# Patient Record
Sex: FEMALE | Race: WHITE | NOT HISPANIC OR LATINO | Employment: UNEMPLOYED | URBAN - METROPOLITAN AREA
[De-identification: names, ages, dates, MRNs, and addresses within clinical notes are randomized per-mention and may not be internally consistent; named-entity substitution may affect disease eponyms.]

---

## 2017-01-27 ENCOUNTER — TELEPHONE (OUTPATIENT)
Dept: CARDIOLOGY | Facility: CLINIC | Age: 82
End: 2017-01-27

## 2017-01-27 NOTE — TELEPHONE ENCOUNTER
----- Message from Claude Benitez sent at 1/27/2017 12:17 PM CST -----  Contact: Dr Dawson office-  [place]-  [phone] office closes at 3 pm  The office called asking for the status for cardiac clearance faxed on 01/25/2017 for the above listed patient. Thanks!

## 2017-02-02 ENCOUNTER — TELEPHONE (OUTPATIENT)
Dept: CARDIOLOGY | Facility: CLINIC | Age: 82
End: 2017-02-02

## 2017-02-02 NOTE — TELEPHONE ENCOUNTER
----- Message from Ana Morel sent at 2/2/2017 11:38 AM CST -----  Contact: [place] with Dr. Dawson's office   Belkys with Dr. Dawson's office called to check on a surgery clearance that was sent for the patient  Please return her call to     Thanks

## 2017-02-02 NOTE — TELEPHONE ENCOUNTER
Returned call to [place] at Dr Dawson's office. No answer, LVM. Dr Hernandez has not signed procedure clearances. Will be Monday before they will be signed at he is off until then

## 2017-02-23 ENCOUNTER — PATIENT MESSAGE (OUTPATIENT)
Dept: CARDIOLOGY | Facility: CLINIC | Age: 82
End: 2017-02-23

## 2017-04-27 RX ORDER — CLOPIDOGREL BISULFATE 75 MG/1
TABLET ORAL
Qty: 90 TABLET | Refills: 6 | Status: SHIPPED | OUTPATIENT
Start: 2017-04-27 | End: 2017-08-25 | Stop reason: SDUPTHER

## 2017-08-25 ENCOUNTER — OFFICE VISIT (OUTPATIENT)
Dept: CARDIOLOGY | Facility: CLINIC | Age: 82
End: 2017-08-25
Payer: MEDICARE

## 2017-08-25 VITALS
HEIGHT: 60 IN | BODY MASS INDEX: 18.74 KG/M2 | WEIGHT: 95.44 LBS | SYSTOLIC BLOOD PRESSURE: 148 MMHG | HEART RATE: 87 BPM | DIASTOLIC BLOOD PRESSURE: 72 MMHG

## 2017-08-25 DIAGNOSIS — I25.10 CORONARY ARTERY DISEASE INVOLVING NATIVE CORONARY ARTERY OF NATIVE HEART WITHOUT ANGINA PECTORIS: Chronic | ICD-10-CM

## 2017-08-25 DIAGNOSIS — Z95.5 S/P CORONARY ARTERY STENT PLACEMENT: Primary | ICD-10-CM

## 2017-08-25 PROCEDURE — 99999 PR PBB SHADOW E&M-EST. PATIENT-LVL III: CPT | Mod: PBBFAC,,, | Performed by: INTERNAL MEDICINE

## 2017-08-25 PROCEDURE — 1159F MED LIST DOCD IN RCRD: CPT | Mod: ,,, | Performed by: INTERNAL MEDICINE

## 2017-08-25 PROCEDURE — 1126F AMNT PAIN NOTED NONE PRSNT: CPT | Mod: ,,, | Performed by: INTERNAL MEDICINE

## 2017-08-25 PROCEDURE — 99213 OFFICE O/P EST LOW 20 MIN: CPT | Mod: PBBFAC,PO | Performed by: INTERNAL MEDICINE

## 2017-08-25 PROCEDURE — 99214 OFFICE O/P EST MOD 30 MIN: CPT | Mod: S$PBB,,, | Performed by: INTERNAL MEDICINE

## 2017-08-25 RX ORDER — CLOPIDOGREL BISULFATE 75 MG/1
75 TABLET ORAL DAILY
Qty: 90 TABLET | Refills: 6 | Status: SHIPPED | OUTPATIENT
Start: 2017-08-25 | End: 2018-09-17 | Stop reason: SDUPTHER

## 2017-08-25 RX ORDER — DILTIAZEM HYDROCHLORIDE 240 MG/1
240 CAPSULE, COATED, EXTENDED RELEASE ORAL NIGHTLY
Qty: 90 CAPSULE | Refills: 5 | Status: ON HOLD | OUTPATIENT
Start: 2017-08-25 | End: 2019-12-10 | Stop reason: HOSPADM

## 2017-08-25 RX ORDER — LISINOPRIL AND HYDROCHLOROTHIAZIDE 12.5; 2 MG/1; MG/1
1 TABLET ORAL EVERY MORNING
Qty: 90 TABLET | Refills: 4 | Status: ON HOLD | OUTPATIENT
Start: 2017-08-25 | End: 2019-04-04 | Stop reason: HOSPADM

## 2017-08-25 NOTE — PROGRESS NOTES
Subjective:    Patient ID:  Jessica Macdonald is a 89 y.o. female who presents for follow-up of Coronary Artery Disease (9 month f/u ) and Hypertension      HPI  Here for follow up of CAD-PCI (2014). Does all ADL's no angina. Still occasional weakness episodes.     Review of Systems   Constitution: Negative for malaise/fatigue.   Eyes: Negative for blurred vision.   Cardiovascular: Negative for chest pain, claudication, cyanosis, dyspnea on exertion, irregular heartbeat, leg swelling, near-syncope, orthopnea, palpitations, paroxysmal nocturnal dyspnea and syncope.   Respiratory: Negative for cough and shortness of breath.    Hematologic/Lymphatic: Does not bruise/bleed easily.   Musculoskeletal: Negative for back pain, falls, joint pain, muscle cramps, muscle weakness and myalgias.   Gastrointestinal: Negative for abdominal pain, change in bowel habit, nausea and vomiting.   Genitourinary: Negative for urgency.   Neurological: Negative for dizziness, focal weakness and light-headedness.        Objective:    Physical Exam   Constitutional: She is oriented to person, place, and time. She appears well-developed and well-nourished.   Neck: Normal range of motion. No JVD present.   Cardiovascular: Normal rate, normal heart sounds and intact distal pulses.  An irregular rhythm present.   Pulmonary/Chest: Effort normal and breath sounds normal.   Neurological: She is alert and oriented to person, place, and time.   Skin: Skin is warm and dry.   Psychiatric: She has a normal mood and affect.             ..    Chemistry        Component Value Date/Time     02/02/2017 0943    K 4.1 02/02/2017 0943     02/02/2017 0943    CO2 27 02/02/2017 0943    BUN 17 02/02/2017 0943    CREATININE 0.66 02/02/2017 0943    GLU 77 02/02/2017 0943        Component Value Date/Time    CALCIUM 9.4 02/02/2017 0943    ALKPHOS 83 02/02/2017 0943    AST 25 02/02/2017 0943    AST 35 04/11/2016 1536    ALT 23 02/02/2017 0943    BILITOT  0.5 02/02/2017 0943    ESTGFRAFRICA >60 02/02/2017 0943    EGFRNONAA >60 02/02/2017 0943            ..  Lab Results   Component Value Date    CHOL 227 (H) 10/08/2016     Lab Results   Component Value Date    HDL 64 10/08/2016     Lab Results   Component Value Date    LDLCALC 140.4 10/08/2016     Lab Results   Component Value Date    TRIG 113 10/08/2016     Lab Results   Component Value Date    CHOLHDL 28.2 10/08/2016     ..  Lab Results   Component Value Date    WBC 8.25 02/02/2017    HGB 12.7 02/02/2017    HCT 39.0 02/02/2017    MCV 94 02/02/2017     (H) 02/02/2017       Test(s) Reviewed  I have reviewed the following in detail:  [] Stress test   [] Angiography   [x] Echocardiogram   [x] Labs   [] Other:       Assessment:         ICD-10-CM ICD-9-CM   1. S/P coronary artery stent placement Z95.5 V45.82   2. Coronary artery disease involving native coronary artery of native heart without angina pectoris I25.10 414.01     Problem List Items Addressed This Visit     CAD (coronary artery disease) (Chronic)    Overview     6/2014 [facility]- LAD- 85% prox and mid, CX-NSD, RCA-65% prox         S/P coronary artery stent placement - Primary    Overview     6/2014 [facility]- veriflex 3x16 and promus 2.75x38           Other Visit Diagnoses    None.          Plan:           Return to clinic 9 months   Low level/low impact aerobic exercise.   See labs and med orders.  Check BP/HR with weak episodes.   Take CCB in pm

## 2018-04-02 RX ORDER — NITROGLYCERIN 0.4 MG/1
0.4 TABLET SUBLINGUAL EVERY 5 MIN PRN
Qty: 25 TABLET | Refills: 4 | Status: SHIPPED | OUTPATIENT
Start: 2018-04-02 | End: 2020-02-07

## 2018-04-09 ENCOUNTER — TELEPHONE (OUTPATIENT)
Dept: CARDIOLOGY | Facility: CLINIC | Age: 83
End: 2018-04-09

## 2018-04-09 NOTE — TELEPHONE ENCOUNTER
----- Message from Arcenio Weinstein sent at 4/9/2018  1:36 PM CDT -----  Contact: pt's daughter Jennifer Rodriguez is calling to speak with a nurse about pt's medications   Call Back#[phone]  Thanks

## 2018-04-09 NOTE — TELEPHONE ENCOUNTER
Spoke to patient daughter, wanted to know when pt. Is due to see Dr. Hernandez. Informed her the end of august 2018. Daughter verbalized understanding.

## 2018-07-17 ENCOUNTER — TELEPHONE (OUTPATIENT)
Dept: CARDIOLOGY | Facility: CLINIC | Age: 83
End: 2018-07-17

## 2018-07-17 NOTE — TELEPHONE ENCOUNTER
Request for Cardiac Clearance    Jessica Rocky Buitrago  is having back procedure and needs clearance.     Please advise on any medication holds. Pt. Taking Plavix    Please indicate if patient is cleared from a Cardiac standpoint.

## 2018-09-17 ENCOUNTER — OFFICE VISIT (OUTPATIENT)
Dept: CARDIOLOGY | Facility: CLINIC | Age: 83
End: 2018-09-17
Payer: MEDICARE

## 2018-09-17 VITALS
BODY MASS INDEX: 17.4 KG/M2 | HEART RATE: 74 BPM | DIASTOLIC BLOOD PRESSURE: 88 MMHG | SYSTOLIC BLOOD PRESSURE: 156 MMHG | WEIGHT: 88.63 LBS | HEIGHT: 60 IN

## 2018-09-17 DIAGNOSIS — I25.10 CORONARY ARTERY DISEASE INVOLVING NATIVE CORONARY ARTERY OF NATIVE HEART WITHOUT ANGINA PECTORIS: Primary | Chronic | ICD-10-CM

## 2018-09-17 DIAGNOSIS — I35.0 NONRHEUMATIC AORTIC VALVE STENOSIS: ICD-10-CM

## 2018-09-17 DIAGNOSIS — Z95.5 S/P CORONARY ARTERY STENT PLACEMENT: ICD-10-CM

## 2018-09-17 DIAGNOSIS — I10 ESSENTIAL HYPERTENSION: Chronic | ICD-10-CM

## 2018-09-17 PROCEDURE — 99999 PR PBB SHADOW E&M-EST. PATIENT-LVL III: CPT | Mod: PBBFAC,,, | Performed by: INTERNAL MEDICINE

## 2018-09-17 PROCEDURE — 99213 OFFICE O/P EST LOW 20 MIN: CPT | Mod: S$PBB,,, | Performed by: INTERNAL MEDICINE

## 2018-09-17 PROCEDURE — 99213 OFFICE O/P EST LOW 20 MIN: CPT | Mod: PBBFAC,PO | Performed by: INTERNAL MEDICINE

## 2018-09-17 RX ORDER — CLOPIDOGREL BISULFATE 75 MG/1
37.5 TABLET ORAL
Qty: 90 TABLET | Refills: 6 | Status: SHIPPED | OUTPATIENT
Start: 2018-09-17 | End: 2019-12-07

## 2018-09-17 RX ORDER — FUROSEMIDE 20 MG/1
20 TABLET ORAL DAILY PRN
Qty: 90 TABLET | Refills: 5 | Status: SHIPPED | OUTPATIENT
Start: 2018-09-17 | End: 2019-12-07

## 2018-09-17 RX ORDER — FUROSEMIDE 20 MG/1
20 TABLET ORAL 2 TIMES DAILY
COMMUNITY
End: 2018-09-17 | Stop reason: SDUPTHER

## 2018-09-17 RX ORDER — TRAMADOL HYDROCHLORIDE 50 MG/1
50 TABLET ORAL EVERY 6 HOURS PRN
Status: ON HOLD | COMMUNITY
End: 2019-04-04 | Stop reason: SDUPTHER

## 2018-09-17 NOTE — PROGRESS NOTES
Subjective:    Patient ID:  Jessica Macdonald is a 89 y.o. female who presents for follow-up of Coronary Artery Disease (annual f/u - review echo ) and Hypertension      HPI  Here for follow up of CAD-PCI (2014). Does all ADL's no angina.        Review of Systems   Constitution: Negative for malaise/fatigue.   Eyes: Negative for blurred vision.   Cardiovascular: Negative for chest pain, claudication, cyanosis, dyspnea on exertion, irregular heartbeat, leg swelling, near-syncope, orthopnea, palpitations, paroxysmal nocturnal dyspnea and syncope.   Respiratory: Negative for cough and shortness of breath.    Hematologic/Lymphatic: Does not bruise/bleed easily.   Musculoskeletal: Negative for back pain, falls, joint pain, muscle cramps, muscle weakness and myalgias.   Gastrointestinal: Negative for abdominal pain, change in bowel habit, nausea and vomiting.   Genitourinary: Negative for urgency.   Neurological: Negative for dizziness, focal weakness and light-headedness.        Objective:            Physical Exam   Constitutional: She is oriented to person, place, and time. She appears well-developed and well-nourished.   Neck: Normal range of motion. No JVD present.   Cardiovascular: Normal rate, normal heart sounds and intact distal pulses. An irregular rhythm present.   Pulmonary/Chest: Effort normal and breath sounds normal.   Neurological: She is alert and oriented to person, place, and time.   Skin: Skin is warm and dry.   Psychiatric: She has a normal mood and affect.       ..    Chemistry        Component Value Date/Time     02/02/2017 0943    K 4.1 02/02/2017 0943     02/02/2017 0943    CO2 27 02/02/2017 0943    BUN 17 02/02/2017 0943    CREATININE 0.66 02/02/2017 0943    GLU 77 02/02/2017 0943        Component Value Date/Time    CALCIUM 9.4 02/02/2017 0943    ALKPHOS 83 02/02/2017 0943    AST 25 02/02/2017 0943    AST 35 04/11/2016 1536    ALT 23 02/02/2017 0943    BILITOT 0.5 02/02/2017 0943     ESTGFRAFRICA >60 02/02/2017 0943    EGFRNONAA >60 02/02/2017 0943            ..  Lab Results   Component Value Date    CHOL 227 (H) 10/08/2016     Lab Results   Component Value Date    HDL 64 10/08/2016     Lab Results   Component Value Date    LDLCALC 140.4 10/08/2016     Lab Results   Component Value Date    TRIG 113 10/08/2016     Lab Results   Component Value Date    CHOLHDL 28.2 10/08/2016     ..  Lab Results   Component Value Date    WBC 8.25 02/02/2017    HGB 12.7 02/02/2017    HCT 39.0 02/02/2017    MCV 94 02/02/2017     (H) 02/02/2017       Test(s) Reviewed  I have reviewed the following in detail:  [] Stress test   [] Angiography   [x] Echocardiogram   [] Labs   [] Other:       Assessment:         ICD-10-CM ICD-9-CM   1. Coronary artery disease involving native coronary artery of native heart without angina pectoris I25.10 414.01   2. S/P coronary artery stent placement Z95.5 V45.82   3. Essential hypertension I10 401.9     Problem List Items Addressed This Visit     CAD (coronary artery disease) - Primary (Chronic)    Overview     6/2014 [facility]- LAD- 85% prox and mid, CX-NSD, RCA-65% prox         Essential hypertension (Chronic)    S/P coronary artery stent placement    Overview     6/2014 [facility]- veriflex 3x16 and promus 2.75x38                Plan:           Return to clinic 18 months   Low level/low impact aerobic exercise 5x's/wk. Heart healthy diet and risk factor modification.    See labs and med orders.  STEVE hose knee high

## 2018-09-27 ENCOUNTER — TELEPHONE (OUTPATIENT)
Dept: CARDIOLOGY | Facility: CLINIC | Age: 83
End: 2018-09-27

## 2018-09-27 NOTE — TELEPHONE ENCOUNTER
----- Message from RT Joseph sent at 9/27/2018  1:10 PM CDT -----  Contact: Jennifer,Daughter    Jennifer,Daughter , requesting to provide the milligrams of the pt's medication: Plavix (generic)  75 mg, thanks.

## 2018-09-27 NOTE — TELEPHONE ENCOUNTER
----- Message from Claude Benitez sent at 9/27/2018  1:33 PM CDT -----  Type: Needs Medical Advice    Who Called:  Daughter  - Jeanna Cuevas  Symptoms (please be specific):  How long has patient had these symptoms:  [facility]  Pharmacy name and phone #:  [facility]  Best Call Back Number: [phone]  Additional Information: Patient's daughter called to give the correct dosage for rx amlodipine 5 mg  Disregard previous message.

## 2019-04-01 PROBLEM — S72.002A CLOSED LEFT HIP FRACTURE: Status: ACTIVE | Noted: 2019-04-01

## 2019-04-01 PROBLEM — S72.002A CLOSED FRACTURE OF NECK OF LEFT FEMUR: Status: ACTIVE | Noted: 2019-04-01

## 2019-04-01 PROBLEM — F41.9 ANXIETY: Status: ACTIVE | Noted: 2019-04-01

## 2019-04-01 PROBLEM — K21.9 GERD (GASTROESOPHAGEAL REFLUX DISEASE): Status: ACTIVE | Noted: 2019-04-01

## 2019-04-02 PROBLEM — E87.1 HYPONATREMIA: Status: ACTIVE | Noted: 2019-04-02

## 2019-04-03 PROBLEM — D64.9 POSTOPERATIVE ANEMIA: Status: ACTIVE | Noted: 2019-04-03

## 2019-04-23 PROBLEM — Z96.642 HISTORY OF LEFT HIP HEMIARTHROPLASTY: Status: ACTIVE | Noted: 2019-04-23

## 2019-06-02 ENCOUNTER — PATIENT MESSAGE (OUTPATIENT)
Dept: CARDIOLOGY | Facility: CLINIC | Age: 84
End: 2019-06-02

## 2019-08-09 PROBLEM — S62.102B LEFT WRIST FRACTURE, OPEN, INITIAL ENCOUNTER: Status: ACTIVE | Noted: 2019-08-09
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
## 2019-08-09 PROBLEM — R07.9 CHEST PAIN: Status: ACTIVE | Noted: 2019-08-09

## 2019-08-09 PROBLEM — R10.84 GENERALIZED ABDOMINAL PAIN: Status: ACTIVE | Noted: 2019-08-09

## 2019-08-10 PROBLEM — R07.9 CHEST PAIN: Status: RESOLVED | Noted: 2019-08-09 | Resolved: 2019-08-10

## 2019-08-10 PROBLEM — R10.84 GENERALIZED ABDOMINAL PAIN: Status: RESOLVED | Noted: 2019-08-09 | Resolved: 2019-08-10

## 2019-08-12 ENCOUNTER — TELEPHONE (OUTPATIENT)
Dept: ORTHOPEDICS | Facility: CLINIC | Age: 84
End: 2019-08-12

## 2019-08-12 NOTE — TELEPHONE ENCOUNTER
Dee states Dr Vazquez asking for pt to come in on Thursday for s/p I&D open fracture left arm.  I informed Dee that I will call her later with an appointment time after speaking to Dr Vazquez.  Dee stated pt has an 11:30 AM appointment on Thursday that will need to be worked around.

## 2019-08-13 ENCOUNTER — TELEPHONE (OUTPATIENT)
Dept: ORTHOPEDICS | Facility: CLINIC | Age: 84
End: 2019-08-13

## 2019-08-13 NOTE — TELEPHONE ENCOUNTER
I called and informed Dee that pt has been scheduled an appointment with Dr Vazquez for 8/15/19 at 10:30 AM.  Dee agreed to appointment date and time and had no other concerns.

## 2019-08-13 NOTE — TELEPHONE ENCOUNTER
Dee called stating pt has an ultrasound that cannot be rescheduled that is conflicting with patient's appointment with Dr Vazquez on 8/15.  I rescheduled pt to see Dr Vazquez on 8/14/19 at 10:20 AM.  Dee agreed to appointment date and time.

## 2019-08-14 ENCOUNTER — OFFICE VISIT (OUTPATIENT)
Dept: ORTHOPEDICS | Facility: CLINIC | Age: 84
End: 2019-08-14
Payer: MEDICARE

## 2019-08-14 VITALS
WEIGHT: 95 LBS | DIASTOLIC BLOOD PRESSURE: 64 MMHG | SYSTOLIC BLOOD PRESSURE: 124 MMHG | BODY MASS INDEX: 19.15 KG/M2 | HEART RATE: 69 BPM | HEIGHT: 59 IN

## 2019-08-14 DIAGNOSIS — S62.102B LEFT WRIST FRACTURE, OPEN, INITIAL ENCOUNTER: Primary | ICD-10-CM

## 2019-08-14 PROCEDURE — 99024 POSTOP FOLLOW-UP VISIT: CPT | Mod: POP,,, | Performed by: ORTHOPAEDIC SURGERY

## 2019-08-14 PROCEDURE — 99024 PR POST-OP FOLLOW-UP VISIT: ICD-10-PCS | Mod: POP,,, | Performed by: ORTHOPAEDIC SURGERY

## 2019-08-14 PROCEDURE — 29125 APPL SHORT ARM SPLINT STATIC: CPT | Mod: 58,S$PBB,LT, | Performed by: ORTHOPAEDIC SURGERY

## 2019-08-14 PROCEDURE — 29125 APPL SHORT ARM SPLINT STATIC: CPT | Mod: PBBFAC,PN | Performed by: ORTHOPAEDIC SURGERY

## 2019-08-14 PROCEDURE — 99213 OFFICE O/P EST LOW 20 MIN: CPT | Mod: PBBFAC,PN,25 | Performed by: ORTHOPAEDIC SURGERY

## 2019-08-14 PROCEDURE — 99999 PR PBB SHADOW E&M-EST. PATIENT-LVL III: CPT | Mod: PBBFAC,,, | Performed by: ORTHOPAEDIC SURGERY

## 2019-08-14 PROCEDURE — 99999 PR PBB SHADOW E&M-EST. PATIENT-LVL III: ICD-10-PCS | Mod: PBBFAC,,, | Performed by: ORTHOPAEDIC SURGERY

## 2019-08-14 PROCEDURE — 29125 PR APPLY FOREARM SPLINT,STATIC: ICD-10-PCS | Mod: 58,S$PBB,LT, | Performed by: ORTHOPAEDIC SURGERY

## 2019-08-14 NOTE — PROGRESS NOTES
Ms Macdonald returns to clinic today.  She is approximately 7-8 days status post open reduction internal fixation of a left distal radius and ulnar fracture. She is stable.  She is still complaining of pain to the left arm.    Physical exam:  Examination of the left arm reveals that the incisions are healing well.  All the skin has maintained intact.  There is mild edema.  There is no erythema.  There is a small amount of sanguinous drainage noted.  She is neurovascularly intact in the fingers.    Assessment:  Status post open reduction internal fixation of an open left distal radius and ulnar fracture.    Plan:    1.  Her wounds were clean today and a new volar splint was placed    2.  She will continue nonweightbearing to left upper extremity    3.  She will follow up with me in 11-12 days for repeat evaluation with an x-ray of the left wrist out of the splint

## 2019-08-23 DIAGNOSIS — M25.532 LEFT WRIST PAIN: Primary | ICD-10-CM

## 2019-08-26 ENCOUNTER — OFFICE VISIT (OUTPATIENT)
Dept: ORTHOPEDICS | Facility: CLINIC | Age: 84
End: 2019-08-26
Payer: MEDICARE

## 2019-08-26 ENCOUNTER — HOSPITAL ENCOUNTER (OUTPATIENT)
Dept: RADIOLOGY | Facility: HOSPITAL | Age: 84
Discharge: HOME | End: 2019-08-26
Attending: ORTHOPAEDIC SURGERY
Payer: MEDICARE

## 2019-08-26 VITALS
HEIGHT: 59 IN | SYSTOLIC BLOOD PRESSURE: 96 MMHG | HEART RATE: 55 BPM | BODY MASS INDEX: 19.15 KG/M2 | WEIGHT: 95 LBS | DIASTOLIC BLOOD PRESSURE: 59 MMHG

## 2019-08-26 DIAGNOSIS — S62.102B LEFT WRIST FRACTURE, OPEN, INITIAL ENCOUNTER: Primary | ICD-10-CM

## 2019-08-26 DIAGNOSIS — M25.532 LEFT WRIST PAIN: ICD-10-CM

## 2019-08-26 PROCEDURE — 73110 X-RAY EXAM OF WRIST: CPT | Mod: 26,LT,, | Performed by: RADIOLOGY

## 2019-08-26 PROCEDURE — 29105 PR APPLY LONG ARM SPLINT: ICD-10-PCS | Mod: 58,S$PBB,LT, | Performed by: ORTHOPAEDIC SURGERY

## 2019-08-26 PROCEDURE — 99999 PR PBB SHADOW E&M-EST. PATIENT-LVL III: CPT | Mod: PBBFAC,,, | Performed by: ORTHOPAEDIC SURGERY

## 2019-08-26 PROCEDURE — 99024 PR POST-OP FOLLOW-UP VISIT: ICD-10-PCS | Mod: POP,,, | Performed by: ORTHOPAEDIC SURGERY

## 2019-08-26 PROCEDURE — 99999 PR PBB SHADOW E&M-EST. PATIENT-LVL III: ICD-10-PCS | Mod: PBBFAC,,, | Performed by: ORTHOPAEDIC SURGERY

## 2019-08-26 PROCEDURE — 99024 POSTOP FOLLOW-UP VISIT: CPT | Mod: POP,,, | Performed by: ORTHOPAEDIC SURGERY

## 2019-08-26 PROCEDURE — 73110 XR WRIST COMPLETE 3 VIEWS LEFT: ICD-10-PCS | Mod: 26,LT,, | Performed by: RADIOLOGY

## 2019-08-26 PROCEDURE — 99213 OFFICE O/P EST LOW 20 MIN: CPT | Mod: PBBFAC,25,PN | Performed by: ORTHOPAEDIC SURGERY

## 2019-08-26 PROCEDURE — 73110 X-RAY EXAM OF WRIST: CPT | Mod: TC,PO,LT

## 2019-08-26 PROCEDURE — 29105 APPLICATION LONG ARM SPLINT: CPT | Mod: PBBFAC,PN | Performed by: ORTHOPAEDIC SURGERY

## 2019-08-26 PROCEDURE — 29105 APPLICATION LONG ARM SPLINT: CPT | Mod: 58,S$PBB,LT, | Performed by: ORTHOPAEDIC SURGERY

## 2019-08-26 NOTE — PROGRESS NOTES
Ms Albright returns to clinic today.  She is approximately 2 weeks status post open reduction internal fixation of a left distal radius and ulnar fracture. She is here today for follow-up.  She is still having mild pain.    Physical exam:  Examination of the left upper extremity reveals that all wounds are healing. There is mild edema.  There is no erythema or drainage. She is neurovascularly intact distally.    Radiology:  X-rays of the left wrist were taken in clinic today.  She is noted to have fractures of the radius and the ulna.  There is a stabilized with plate and screw fixation.  The alignment remains stable and the plate and screws remain well fixed    Assessment:  Status post open reduction internal fixation left distal radius and ulnar fractures    Plan:    1.  Sutures were removed today    2.  A sugar-tong splint was replaced    3.  She will follow up with me in 2 weeks for repeat evaluation with an x-ray of the left wrist out of the splint which time I will consider going to a Velcro splint based off of the appearance of her skin

## 2019-08-28 ENCOUNTER — PATIENT MESSAGE (OUTPATIENT)
Dept: ORTHOPEDICS | Facility: CLINIC | Age: 84
End: 2019-08-28

## 2019-08-29 ENCOUNTER — TELEPHONE (OUTPATIENT)
Dept: ORTHOPEDICS | Facility: CLINIC | Age: 84
End: 2019-08-29

## 2019-08-29 NOTE — TELEPHONE ENCOUNTER
Dee asking for clarification on xray report suggesting ligament tear.  I informed pt Dr Vazquez reviewed xray and felt space between the scaphoid and lunate are acceptable. Regarding treatment Dr Vazquez wanted informed with this type of injury patient's will develop arthritis in approximately 5 - 6 years.  Given patient's age risks outweigh benefits if surgical intervention was to be performed.  Dee verbalized understanding and stated she did not think it was necessary to change patient's current treatment plan as recommended by Dr Vazquez during office visit.  Dee had no other questions or concerns.

## 2019-08-29 NOTE — TELEPHONE ENCOUNTER
Can you or someone from your office contact Dee Mann to explain the x-ray test results to her?  She was unable to come to the appointment .     [email or web address]  or [phone].

## 2019-09-04 DIAGNOSIS — M25.532 LEFT WRIST PAIN: Primary | ICD-10-CM

## 2019-09-09 ENCOUNTER — OFFICE VISIT (OUTPATIENT)
Dept: ORTHOPEDICS | Facility: CLINIC | Age: 84
End: 2019-09-09
Payer: MEDICARE

## 2019-09-09 ENCOUNTER — HOSPITAL ENCOUNTER (OUTPATIENT)
Dept: RADIOLOGY | Facility: HOSPITAL | Age: 84
Discharge: HOME | End: 2019-09-09
Attending: ORTHOPAEDIC SURGERY
Payer: MEDICARE

## 2019-09-09 VITALS
HEART RATE: 86 BPM | HEIGHT: 59 IN | WEIGHT: 95 LBS | BODY MASS INDEX: 19.15 KG/M2 | DIASTOLIC BLOOD PRESSURE: 70 MMHG | SYSTOLIC BLOOD PRESSURE: 142 MMHG

## 2019-09-09 DIAGNOSIS — S62.102B LEFT WRIST FRACTURE, OPEN, INITIAL ENCOUNTER: Primary | ICD-10-CM

## 2019-09-09 DIAGNOSIS — M25.532 LEFT WRIST PAIN: ICD-10-CM

## 2019-09-09 PROCEDURE — 73110 X-RAY EXAM OF WRIST: CPT | Mod: TC,PO,LT

## 2019-09-09 PROCEDURE — 73110 XR WRIST COMPLETE 3 VIEWS LEFT: ICD-10-PCS | Mod: 26,LT,, | Performed by: RADIOLOGY

## 2019-09-09 PROCEDURE — 99999 PR PBB SHADOW E&M-EST. PATIENT-LVL III: CPT | Mod: PBBFAC,,, | Performed by: ORTHOPAEDIC SURGERY

## 2019-09-09 PROCEDURE — 73110 X-RAY EXAM OF WRIST: CPT | Mod: 26,LT,, | Performed by: RADIOLOGY

## 2019-09-09 PROCEDURE — 99024 PR POST-OP FOLLOW-UP VISIT: ICD-10-PCS | Mod: POP,,, | Performed by: ORTHOPAEDIC SURGERY

## 2019-09-09 PROCEDURE — 99024 POSTOP FOLLOW-UP VISIT: CPT | Mod: POP,,, | Performed by: ORTHOPAEDIC SURGERY

## 2019-09-09 PROCEDURE — 99999 PR PBB SHADOW E&M-EST. PATIENT-LVL III: ICD-10-PCS | Mod: PBBFAC,,, | Performed by: ORTHOPAEDIC SURGERY

## 2019-09-09 PROCEDURE — 99213 OFFICE O/P EST LOW 20 MIN: CPT | Mod: PBBFAC,25,PN | Performed by: ORTHOPAEDIC SURGERY

## 2019-09-09 NOTE — PROGRESS NOTES
Ms Macdonald returns to clinic today.  She is approximately 4 weeks status post open reduction internal fixation of left radius and ulnar fracture.  She is overall doing well but was complaining that the splint was causing some pain    Physical:  Examination of the left forearm wrist and hand reveals that all wounds are now healed.  There is minimal to no edema.  There is no erythema or drainage noted. She does report grossly intact sensation in the median radial and ulnar distribution.  She has capillary refill less than 2 sec in all the digits.    Radiology:  X-rays of the left wrist were taken in clinic today.  There is noted to be fractures of the radius and the ulna.  These are stabilized with plate screw fixation.  The alignment remains stable and the hardware is well fixed and    Assessment:  Status post open reduction internal fixation left distal radius and ulnar fractures    Plan:    1.  I will place her into a Velcro forearm splint for which she will have to wear full-time except for bathing    2.  She will be limited weight-bearing to the left arm and hand    3.  She can begin gentle range of motion of the fingers    4.  She will follow up with me in 2 weeks with x-rays of the left wrist out of the splint

## 2019-09-20 DIAGNOSIS — M25.532 LEFT WRIST PAIN: Primary | ICD-10-CM

## 2019-09-23 ENCOUNTER — OFFICE VISIT (OUTPATIENT)
Dept: ORTHOPEDICS | Facility: CLINIC | Age: 84
End: 2019-09-23
Payer: MEDICARE

## 2019-09-23 ENCOUNTER — HOSPITAL ENCOUNTER (OUTPATIENT)
Dept: RADIOLOGY | Facility: HOSPITAL | Age: 84
Discharge: HOME | End: 2019-09-23
Attending: ORTHOPAEDIC SURGERY
Payer: MEDICARE

## 2019-09-23 VITALS
WEIGHT: 95 LBS | DIASTOLIC BLOOD PRESSURE: 64 MMHG | BODY MASS INDEX: 19.15 KG/M2 | HEIGHT: 59 IN | SYSTOLIC BLOOD PRESSURE: 121 MMHG | HEART RATE: 64 BPM

## 2019-09-23 DIAGNOSIS — M25.532 LEFT WRIST PAIN: ICD-10-CM

## 2019-09-23 DIAGNOSIS — S62.102B LEFT WRIST FRACTURE, OPEN, INITIAL ENCOUNTER: Primary | ICD-10-CM

## 2019-09-23 PROCEDURE — 99024 POSTOP FOLLOW-UP VISIT: CPT | Mod: POP,,, | Performed by: ORTHOPAEDIC SURGERY

## 2019-09-23 PROCEDURE — 73110 XR WRIST COMPLETE 3 VIEWS LEFT: ICD-10-PCS | Mod: 26,LT,, | Performed by: RADIOLOGY

## 2019-09-23 PROCEDURE — 73110 X-RAY EXAM OF WRIST: CPT | Mod: 26,LT,, | Performed by: RADIOLOGY

## 2019-09-23 PROCEDURE — 99213 OFFICE O/P EST LOW 20 MIN: CPT | Mod: PBBFAC,25,PN | Performed by: ORTHOPAEDIC SURGERY

## 2019-09-23 PROCEDURE — 99024 PR POST-OP FOLLOW-UP VISIT: ICD-10-PCS | Mod: POP,,, | Performed by: ORTHOPAEDIC SURGERY

## 2019-09-23 PROCEDURE — 99999 PR PBB SHADOW E&M-EST. PATIENT-LVL III: ICD-10-PCS | Mod: PBBFAC,,, | Performed by: ORTHOPAEDIC SURGERY

## 2019-09-23 PROCEDURE — 99999 PR PBB SHADOW E&M-EST. PATIENT-LVL III: CPT | Mod: PBBFAC,,, | Performed by: ORTHOPAEDIC SURGERY

## 2019-09-23 PROCEDURE — 73110 X-RAY EXAM OF WRIST: CPT | Mod: TC,PO,LT

## 2019-09-23 NOTE — PROGRESS NOTES
Ms Macdonald returns to clinic today. She is approximately 6 weeks status post left radius and ulnar fracture for which he underwent open reduction internal fixation.  She has been wearing a Velcro brace.  She is overall well.  Still has some pain to the left wrist and hand    Physical exam:  Examination of the left wrist and hand reveals that all wounds are now healed.  There is no edema.  There is no erythema.  Palpation produces minimal to no tenderness.  She does have capillary refill less than 2 sec in all the digits.    Radiology:  X-rays of the left wrist were taken in clinic today.  There are noted to be fractures of the radius of the ulna.  These a stabilized with plate and screw fixation.  The alignment remains stable and there is a small amount of callus noted.    Assessment:  Status post open reduction internal fixation left distal radius and ulnar fractures    Plan:    1.  I will have occupational therapy begin range of motion of the hand and fingers    2.  She will continue to wear the Velcro brace    3.  She will be limited weight-bearing to the left arm and hand    4.  She will follow up with me in 3 weeks with repeat x-rays of the left wrist at which time I will consider the timing of discontinuing the splint

## 2019-10-03 ENCOUNTER — TELEPHONE (OUTPATIENT)
Dept: ORTHOPEDICS | Facility: CLINIC | Age: 84
End: 2019-10-03

## 2019-10-03 NOTE — TELEPHONE ENCOUNTER
I informed pt her appointment with Dr Vazquez on 10/14/19 AM will need to be rescheduled due to provider is out of clinic that morning.  Pt stated she will call back when she has her calendar to help facilitate rescheduling appointment.

## 2019-10-08 DIAGNOSIS — S62.102B LEFT WRIST FRACTURE, OPEN, INITIAL ENCOUNTER: Primary | ICD-10-CM

## 2019-10-08 DIAGNOSIS — Z99.89 WALKER AS AMBULATION AID: ICD-10-CM

## 2019-10-08 DIAGNOSIS — Z96.642 HISTORY OF HEMIARTHROPLASTY OF LEFT HIP: ICD-10-CM

## 2019-10-08 NOTE — TELEPHONE ENCOUNTER
I informed Dee after Dr Vazquez reviewed her message concerning patient Dr Vazquez is requesting a platform walker for patient and ordering physical therapy to help with ambulation.  Dee stated patient's physical therapy is performed at [facility].  Dee had no other questions or concerns at this time.

## 2019-10-11 DIAGNOSIS — M25.532 LEFT WRIST PAIN: Primary | ICD-10-CM

## 2019-10-14 ENCOUNTER — TELEPHONE (OUTPATIENT)
Dept: ORTHOPEDICS | Facility: CLINIC | Age: 84
End: 2019-10-14

## 2019-10-14 NOTE — TELEPHONE ENCOUNTER
Rescheduled patient's appt as requested. Daughter is asking about what was determined in regards to adding a platform to patient's walker as she has not heard back.

## 2019-10-14 NOTE — TELEPHONE ENCOUNTER
----- Message from Levi Maloney sent at 10/14/2019 10:46 AM CDT -----  Contact: stefanie   // daughter  Needs to speak to you and hector appt today, [phone]

## 2019-10-15 NOTE — TELEPHONE ENCOUNTER
I informed Dee that patient's walker was dispensed by [employer] and that our DME dispenser is not able to modify equipment from other DME companies.  I informed Dee that the platform walker order was faxed to [employer] and encouraged Dee to call [email or web address] to check on status of order.  Dee verbalized understanding and had no other concerns to address at this time.

## 2019-10-21 ENCOUNTER — HOSPITAL ENCOUNTER (OUTPATIENT)
Dept: RADIOLOGY | Facility: HOSPITAL | Age: 84
Discharge: HOME | End: 2019-10-21
Attending: ORTHOPAEDIC SURGERY
Payer: MEDICARE

## 2019-10-21 ENCOUNTER — OFFICE VISIT (OUTPATIENT)
Dept: ORTHOPEDICS | Facility: CLINIC | Age: 84
End: 2019-10-21
Payer: MEDICARE

## 2019-10-21 VITALS
WEIGHT: 95 LBS | HEIGHT: 59 IN | BODY MASS INDEX: 19.15 KG/M2 | DIASTOLIC BLOOD PRESSURE: 68 MMHG | SYSTOLIC BLOOD PRESSURE: 115 MMHG | HEART RATE: 65 BPM

## 2019-10-21 DIAGNOSIS — M25.532 LEFT WRIST PAIN: ICD-10-CM

## 2019-10-21 DIAGNOSIS — S62.102B LEFT WRIST FRACTURE, OPEN, INITIAL ENCOUNTER: Primary | ICD-10-CM

## 2019-10-21 PROCEDURE — 73110 XR WRIST COMPLETE 3 VIEWS LEFT: ICD-10-PCS | Mod: 26,LT,, | Performed by: RADIOLOGY

## 2019-10-21 PROCEDURE — 99024 PR POST-OP FOLLOW-UP VISIT: ICD-10-PCS | Mod: POP,,, | Performed by: ORTHOPAEDIC SURGERY

## 2019-10-21 PROCEDURE — 73110 X-RAY EXAM OF WRIST: CPT | Mod: 26,LT,, | Performed by: RADIOLOGY

## 2019-10-21 PROCEDURE — 99024 POSTOP FOLLOW-UP VISIT: CPT | Mod: POP,,, | Performed by: ORTHOPAEDIC SURGERY

## 2019-10-21 PROCEDURE — 73110 X-RAY EXAM OF WRIST: CPT | Mod: TC,PO,LT

## 2019-10-21 PROCEDURE — 99213 OFFICE O/P EST LOW 20 MIN: CPT | Mod: PBBFAC,25,PN | Performed by: ORTHOPAEDIC SURGERY

## 2019-10-21 PROCEDURE — 99999 PR PBB SHADOW E&M-EST. PATIENT-LVL III: ICD-10-PCS | Mod: PBBFAC,,, | Performed by: ORTHOPAEDIC SURGERY

## 2019-10-21 PROCEDURE — 99999 PR PBB SHADOW E&M-EST. PATIENT-LVL III: CPT | Mod: PBBFAC,,, | Performed by: ORTHOPAEDIC SURGERY

## 2019-10-21 NOTE — PROGRESS NOTES
Ms Nevarez returns to clinic today.  She is approximately 9-10 weeks status post left radius and ulnar fracture. She is overall doing well.  There are no complaints.    Physical exam:  Examination of the left forearm and wrist reveals that all wounds are well healed.  There is no edema or erythema.  The plate is mildly prominent over the ulnar side of the wrist.  There is no skin breakdown noted. She does report intact sensation in the median radial ulnar distributions and has capillary refill less than 2 sec in all the digits    Radiology:  X-rays of the left wrist were taken in clinic today.  There noted be fractures of the distal 3rd of the radius and the ulna.  These are stabilized with plate and screw fixation.  There appeared to be healing well and are well aligned    Assessment:  Left distal radius and ulnar fractures    Plan:    1.  We will wean out of the Velcro brace    2.  She can begin increase weight-bearing as tolerated    3.  She will follow up with me in 6 weeks for final evaluation

## 2019-10-24 ENCOUNTER — TELEPHONE (OUTPATIENT)
Dept: ORTHOPEDICS | Facility: CLINIC | Age: 84
End: 2019-10-24

## 2019-10-24 NOTE — TELEPHONE ENCOUNTER
----- Message from Holly Reardon MA sent at 10/24/2019  2:49 PM CDT -----  Contact: PT dept of [facility]   Calling to check on appt and brace   Call back [phone]

## 2019-10-24 NOTE — TELEPHONE ENCOUNTER
I read back Dr. Vazquez's plan from last office visit with this provider to Natalia.  I informed Natalia that Dr. Vazquez still wants pt to wear her brace when she is using her walker.  Natalia asked she could supply pt with a shorter wrist brace.  I informed Natalia Vazquez wants pt to continue using the long wrist brace.  Natalia stated pt will continue to use the long wrist brace.

## 2019-12-02 ENCOUNTER — OFFICE VISIT (OUTPATIENT)
Dept: ORTHOPEDICS | Facility: CLINIC | Age: 84
End: 2019-12-02
Payer: MEDICARE

## 2019-12-02 VITALS
BODY MASS INDEX: 19.15 KG/M2 | DIASTOLIC BLOOD PRESSURE: 62 MMHG | SYSTOLIC BLOOD PRESSURE: 102 MMHG | HEIGHT: 59 IN | WEIGHT: 95 LBS | HEART RATE: 53 BPM

## 2019-12-02 DIAGNOSIS — S62.102B LEFT WRIST FRACTURE, OPEN, INITIAL ENCOUNTER: Primary | ICD-10-CM

## 2019-12-02 PROCEDURE — 1126F PR PAIN SEVERITY QUANTIFIED, NO PAIN PRESENT: ICD-10-PCS | Mod: ,,, | Performed by: ORTHOPAEDIC SURGERY

## 2019-12-02 PROCEDURE — 1126F AMNT PAIN NOTED NONE PRSNT: CPT | Mod: ,,, | Performed by: ORTHOPAEDIC SURGERY

## 2019-12-02 PROCEDURE — 1159F PR MEDICATION LIST DOCUMENTED IN MEDICAL RECORD: ICD-10-PCS | Mod: ,,, | Performed by: ORTHOPAEDIC SURGERY

## 2019-12-02 PROCEDURE — 99213 OFFICE O/P EST LOW 20 MIN: CPT | Mod: PBBFAC,PN | Performed by: ORTHOPAEDIC SURGERY

## 2019-12-02 PROCEDURE — 99213 PR OFFICE/OUTPT VISIT, EST, LEVL III, 20-29 MIN: ICD-10-PCS | Mod: S$PBB,,, | Performed by: ORTHOPAEDIC SURGERY

## 2019-12-02 PROCEDURE — 99213 OFFICE O/P EST LOW 20 MIN: CPT | Mod: S$PBB,,, | Performed by: ORTHOPAEDIC SURGERY

## 2019-12-02 PROCEDURE — 99999 PR PBB SHADOW E&M-EST. PATIENT-LVL III: ICD-10-PCS | Mod: PBBFAC,,, | Performed by: ORTHOPAEDIC SURGERY

## 2019-12-02 PROCEDURE — 1159F MED LIST DOCD IN RCRD: CPT | Mod: ,,, | Performed by: ORTHOPAEDIC SURGERY

## 2019-12-02 PROCEDURE — 99999 PR PBB SHADOW E&M-EST. PATIENT-LVL III: CPT | Mod: PBBFAC,,, | Performed by: ORTHOPAEDIC SURGERY

## 2019-12-02 RX ORDER — DILTIAZEM HYDROCHLORIDE 240 MG/1
240 CAPSULE, EXTENDED RELEASE ORAL DAILY
Refills: 2 | COMMUNITY
Start: 2019-11-07 | End: 2019-12-07

## 2019-12-02 RX ORDER — FAMOTIDINE 40 MG/1
40 TABLET, FILM COATED ORAL NIGHTLY
Refills: 2 | COMMUNITY
Start: 2019-11-18 | End: 2020-08-15 | Stop reason: CLARIF

## 2019-12-03 NOTE — PROGRESS NOTES
Ms Macdonald returns to clinic today. She has a history of left open radius and ulnar fracture. She is doing very well.  She is now approximately 4 months post surgery. There are no complaints.    Physical exam:  Examination left forearm and wrist reveals that the wounds are well healed.  There is no deformity.  There is no edema.  Palpation produces no tenderness.  She is grossly neurovascularly intact.    Assessment:  Left radius and ulnar fracture    Plan:    1.  She is allowed to increase weight-bearing as tolerated    2.  She will follow up with me on a p.r.n. basis

## 2019-12-07 PROBLEM — R55 SYNCOPE: Status: ACTIVE | Noted: 2019-12-07

## 2019-12-07 PROBLEM — S22.089A CLOSED T12 FRACTURE: Status: ACTIVE | Noted: 2019-12-07

## 2019-12-08 PROBLEM — R00.1 BRADYCARDIA: Status: ACTIVE | Noted: 2019-12-08

## 2019-12-09 PROBLEM — I65.22 CAROTID STENOSIS, ASYMPTOMATIC, LEFT: Status: ACTIVE | Noted: 2019-12-09

## 2019-12-11 ENCOUNTER — TELEPHONE (OUTPATIENT)
Dept: NEUROSURGERY | Facility: CLINIC | Age: 84
End: 2019-12-11

## 2019-12-11 DIAGNOSIS — S22.088A OTHER CLOSED FRACTURE OF TWELFTH THORACIC VERTEBRA, INITIAL ENCOUNTER: Primary | ICD-10-CM

## 2019-12-11 NOTE — TELEPHONE ENCOUNTER
----- Message from Stephen Atwood MD sent at 12/9/2019 12:34 PM CST -----  Please schedule 4 week standing ap/lat xray of t spine and followup with Janet in clinic.    Thank you!

## 2019-12-30 ENCOUNTER — PATIENT MESSAGE (OUTPATIENT)
Dept: NEUROSURGERY | Facility: CLINIC | Age: 84
End: 2019-12-30

## 2020-01-06 ENCOUNTER — TELEPHONE (OUTPATIENT)
Dept: CARDIOLOGY | Facility: CLINIC | Age: 85
End: 2020-01-06

## 2020-01-06 RX ORDER — CLOPIDOGREL BISULFATE 75 MG/1
TABLET ORAL
Qty: 90 TABLET | Refills: 4 | Status: SHIPPED | OUTPATIENT
Start: 2020-01-06 | End: 2021-01-29

## 2020-01-06 NOTE — TELEPHONE ENCOUNTER
----- Message from Noé Clemens sent at 1/6/2020  1:25 PM CST -----  Contact: [facility]  Type:  Pharmacy Calling to Clarify an RX    Name of Caller:  Jennifer  Pharmacy Name:    [facility] - [place], [place] - [address] [address], [address]  [address] [address], [address]  [facility] [address]  Phone: [phone] Fax: [phone]    Prescription Name:  PLAUVIX 75mg  What do they need to clarify?:  1/2 by mouth every Mon/Wed/Fri   Best Call Back Number:  [phone]  Additional Information:  Pharm called for refill b/c pt called pharm

## 2020-01-09 ENCOUNTER — HOSPITAL ENCOUNTER (OUTPATIENT)
Dept: RADIOLOGY | Facility: HOSPITAL | Age: 85
Discharge: HOME | End: 2020-01-09
Attending: NEUROLOGICAL SURGERY
Payer: MEDICARE

## 2020-01-09 ENCOUNTER — HOSPITAL ENCOUNTER (OUTPATIENT)
Dept: RADIOLOGY | Facility: HOSPITAL | Age: 85
Discharge: HOME | End: 2020-01-09
Attending: PHYSICIAN ASSISTANT
Payer: MEDICARE

## 2020-01-09 ENCOUNTER — OFFICE VISIT (OUTPATIENT)
Dept: NEUROSURGERY | Facility: CLINIC | Age: 85
End: 2020-01-09
Payer: MEDICARE

## 2020-01-09 VITALS — DIASTOLIC BLOOD PRESSURE: 85 MMHG | HEIGHT: 59 IN | BODY MASS INDEX: 18.99 KG/M2 | SYSTOLIC BLOOD PRESSURE: 115 MMHG

## 2020-01-09 DIAGNOSIS — S22.080A COMPRESSION FRACTURE OF T12 VERTEBRA, INITIAL ENCOUNTER: ICD-10-CM

## 2020-01-09 DIAGNOSIS — S22.088A OTHER CLOSED FRACTURE OF TWELFTH THORACIC VERTEBRA, INITIAL ENCOUNTER: ICD-10-CM

## 2020-01-09 DIAGNOSIS — S22.080A COMPRESSION FRACTURE OF T12 VERTEBRA, INITIAL ENCOUNTER: Primary | ICD-10-CM

## 2020-01-09 PROCEDURE — 99214 OFFICE O/P EST MOD 30 MIN: CPT | Mod: S$PBB,,, | Performed by: PHYSICIAN ASSISTANT

## 2020-01-09 PROCEDURE — 99999 PR PBB SHADOW E&M-EST. PATIENT-LVL III: CPT | Mod: PBBFAC,,, | Performed by: PHYSICIAN ASSISTANT

## 2020-01-09 PROCEDURE — 1159F MED LIST DOCD IN RCRD: CPT | Mod: ,,, | Performed by: PHYSICIAN ASSISTANT

## 2020-01-09 PROCEDURE — 1125F AMNT PAIN NOTED PAIN PRSNT: CPT | Mod: ,,, | Performed by: PHYSICIAN ASSISTANT

## 2020-01-09 PROCEDURE — 99999 PR PBB SHADOW E&M-EST. PATIENT-LVL III: ICD-10-PCS | Mod: PBBFAC,,, | Performed by: PHYSICIAN ASSISTANT

## 2020-01-09 PROCEDURE — 1159F PR MEDICATION LIST DOCUMENTED IN MEDICAL RECORD: ICD-10-PCS | Mod: ,,, | Performed by: PHYSICIAN ASSISTANT

## 2020-01-09 PROCEDURE — 1125F PR PAIN SEVERITY QUANTIFIED, PAIN PRESENT: ICD-10-PCS | Mod: ,,, | Performed by: PHYSICIAN ASSISTANT

## 2020-01-09 PROCEDURE — 72100 X-RAY EXAM L-S SPINE 2/3 VWS: CPT | Mod: TC,FY,PO

## 2020-01-09 PROCEDURE — 72070 X-RAY EXAM THORAC SPINE 2VWS: CPT | Mod: TC,FY,PO

## 2020-01-09 PROCEDURE — 99214 PR OFFICE/OUTPT VISIT, EST, LEVL IV, 30-39 MIN: ICD-10-PCS | Mod: S$PBB,,, | Performed by: PHYSICIAN ASSISTANT

## 2020-01-09 PROCEDURE — 72100 XR LUMBAR SPINE AP AND LATERAL: ICD-10-PCS | Mod: 26,,, | Performed by: RADIOLOGY

## 2020-01-09 PROCEDURE — 72070 X-RAY EXAM THORAC SPINE 2VWS: CPT | Mod: 26,,, | Performed by: RADIOLOGY

## 2020-01-09 PROCEDURE — 72070 XR THORACIC SPINE AP LATERAL: ICD-10-PCS | Mod: 26,,, | Performed by: RADIOLOGY

## 2020-01-09 PROCEDURE — 99213 OFFICE O/P EST LOW 20 MIN: CPT | Mod: PBBFAC,25,PN | Performed by: PHYSICIAN ASSISTANT

## 2020-01-09 PROCEDURE — 72100 X-RAY EXAM L-S SPINE 2/3 VWS: CPT | Mod: 26,,, | Performed by: RADIOLOGY

## 2020-01-10 NOTE — PROGRESS NOTES
Neurosurgery History & Physical    Patient ID: Jessica Macdonald is a 89 y.o. female.    Chief Complaint   Patient presents with    Follow-up     Pt presenting for 4 week follow up for T12 fracture. She is c/o worsening pain to the thoracic area and it is spreading to the left of her spine. Pt is in a wheelchair.        History of Present Illness:     Ms. Jessica Macdonald is a very pleasant 89 y.o. female presenting for [facility] emergency room follow-up.  She is 4 weeks status post acute T12 compression fracture status post fall after sustaining loss of consciousness.  She denies lower extremity pain, weakness, numbness.  She has no new bowel or bladder dysfunction.  The pain is improving slowly.  She is having difficulty tolerating the TLSO brace due to her small stature as well as severe scoliosis deformity.  Her main complaint is left sided flank pain rather than back pain.    Review of Systems   Constitutional: Negative for activity change, chills, fever and unexpected weight change.   HENT: Negative for hearing loss, tinnitus, trouble swallowing and voice change.    Eyes: Negative.  Negative for visual disturbance.   Respiratory: Negative for apnea, chest tightness and shortness of breath.    Cardiovascular: Negative.  Negative for chest pain and palpitations.   Gastrointestinal: Negative.  Negative for abdominal pain, constipation, diarrhea, nausea and vomiting.   Genitourinary: Negative.  Negative for difficulty urinating, dysuria and frequency.   Musculoskeletal: Positive for back pain. Negative for gait problem, myalgias, neck pain and neck stiffness.   Skin: Negative for wound.   Allergic/Immunologic: Negative for immunocompromised state.   Neurological: Negative for dizziness, tremors, seizures, facial asymmetry, speech difficulty, weakness, light-headedness, numbness and headaches.   Hematological: Negative for adenopathy. Does not bruise/bleed easily.    Psychiatric/Behavioral: Negative for confusion, decreased concentration, dysphoric mood and sleep disturbance.       Past Medical History:   Diagnosis Date    AS (aortic stenosis) 9/17/2018 6/2018 CHIKA 1.68    CAD (coronary artery disease) 6/24/2014 6/2014 LHC- LAD- 85% prox and mid, CX-NSD, RCA-65% prox     Closed left hip fracture 4/1/2019    Hypertension     S/P coronary artery stent placement 6/24/2014 6/2014 LAD- veriflex 3x16 and promus 2.75x38     Sjoegren syndrome     Ulcer      Social History     Socioeconomic History    Marital status:      Spouse name: Not on file    Number of children: Not on file    Years of education: Not on file    Highest education level: Not on file   Occupational History    Not on file   Social Needs    Financial resource strain: Not on file    Food insecurity:     Worry: Not on file     Inability: Not on file    Transportation needs:     Medical: Not on file     Non-medical: Not on file   Tobacco Use    Smoking status: Former Smoker    Smokeless tobacco: Never Used    Tobacco comment: from about ages [age]   Substance and Sexual Activity    Alcohol use: Yes     Comment: 1 small glass of wine weekly    Drug use: No    Sexual activity: Not on file   Lifestyle    Physical activity:     Days per week: Not on file     Minutes per session: Not on file    Stress: Not on file   Relationships    Social connections:     Talks on phone: Not on file     Gets together: Not on file     Attends [employer] service: Not on file     Active member of club or organization: Not on file     Attends meetings of clubs or organizations: Not on file     Relationship status: Not on file   Other Topics Concern    Not on file   Social History Narrative    Not on file     No family history on file.  Review of patient's allergies indicates:   Allergen Reactions    Ciprofloxacin hcl     Crestor [rosuvastatin]      Dizziness      Duradex [dextromethorphan-guaifenesin]       Dizziness      Levaquin [levofloxacin] Other (See Comments)     Increases blood pressure      Metronidazole Other (See Comments)    Nitrofurantoin monohyd/m-cryst Other (See Comments)    Statins-hmg-coa reductase inhibitors      myalgias    Vioxx [rofecoxib]        Current Outpatient Medications:     amLODIPine (NORVASC) 5 MG tablet, Take 5 mg by mouth daily as needed (if systolic is over 160)., Disp: , Rfl:     aspirin (ECOTRIN) 81 MG EC tablet, Take 81 mg by mouth every Mon, Wed, Fri. , Disp: , Rfl:     brimonidine-timolol (COMBIGAN) 0.2-0.5 % Drop, Place 1 drop into the left eye 2 (two) times daily., Disp: , Rfl:     calcium carbonate (OS-ASH) 500 mg calcium (1,250 mg) tablet, Take 1 tablet by mouth once daily., Disp: , Rfl:     clopidogrel (PLAVIX) 75 mg tablet, TAKE ONE-HALF TABLET BY MOUTH EVERY MONDAY, WEDNESDAY, AND FRIDAY, Disp: 90 tablet, Rfl: 4    diltiaZEM (CARDIZEM CD) 120 MG Cp24, Take 1 capsule (120 mg total) by mouth nightly., Disp: 30 capsule, Rfl: 11    famotidine (PEPCID) 40 MG tablet, Take 40 mg by mouth every evening., Disp: , Rfl: 2    hydrocortisone (ANUSOL-HC) 2.5 % rectal cream, Place rectally daily as needed for Hemorrhoids. , Disp: , Rfl: 0    latanoprost 0.005 % ophthalmic solution, Place 1 drop into the left eye every evening., Disp: , Rfl:     LORazepam (ATIVAN) 1 MG tablet, Take 1 tablet (1 mg total) by mouth every 6 (six) hours as needed for Anxiety., Disp: 15 tablet, Rfl: 0    MULTIVITS-MIN/IRON/FA/LUTEIN (CENTRUM SILVER WOMEN ORAL), Take 1 tablet by mouth once daily., Disp: , Rfl:     NITROSTAT 0.4 mg SL tablet, Place 1 tablet (0.4 mg total) under the tongue every 5 (five) minutes as needed for Chest pain., Disp: 25 tablet, Rfl: 4    pantoprazole (PROTONIX) 40 MG tablet, Take 40 mg by mouth once daily., Disp: , Rfl: 0    polyethylene glycol (GLYCOLAX) 17 gram PwPk, Take 17 g by mouth once daily. (Patient taking differently: Take 17 g by mouth daily as needed.  "), Disp: , Rfl: 0    propylene glycol (SYSTANE COMPLETE) 0.6 % Drop, Apply 1 drop to eye 3 (three) times daily as needed (dry eye). Apply to both eyes PRN dry eye, Disp: , Rfl:     senna-docusate 8.6-50 mg (PERICOLACE) 8.6-50 mg per tablet, Take 2 tablets by mouth daily as needed for Constipation., Disp: , Rfl:     Vitals:    01/09/20 1258   BP: 115/85   Height: 4' 11" (1.499 m)   PainSc:   8     Body mass index is 18.99 kg/m².        Neurologic Exam    General: well developed, well nourished, no distress.   Head: normocephalic, atraumatic  Neurologic: Alert and oriented. Thought content appropriate.  Hector Coma Score: Motor: 6/Verbal: 5/Eyes: 4 GCS Total: 15  Mental Status: Awake, Alert, Oriented x3  Cranial nerves: face symmetric, tongue midline, CN II-XII grossly intact.   Eyes: pupils equal, round, reactive to light with accomodation, EOMI. Unable to appreciate optic disc. Red reflex intact bilaterally.   Sensory: intact to light touch throughout    Motor Strength:Moves all extremities spontaneously with good tone.  Full strength upper and lower extremities. No abnormal movements seen.     DTR's -1 + and symmetric in UE and LE  Pronator Drift: no drift noted  Finger-to-nose: Intact bilaterally  Fletcher: absent  Clonus: absent  Babinski: absent  Pulses: 2+ and symmetric radial and dorsalis pedis. No lower extremity edema  Pulm: Normal respiratory effort  Abd: Soft, no distention or tenderness  Skin: Intact, no visible rashes or lesions    She does have significant scoliotic deformity and tenderness to palpation over what would approximately be the T12 vertebrae.  She has no tenderness to palpation over the ribs on the left with the she states her pain is.    Oswestry: 72%  PHQ: 14    Imaging:   Given the difficulty with assessment on the thoracic spine x-ray, I have ordered a lumbar spine x-ray to see if the T12 compression fracture would be better evaluated given her scoliotic deformity as well as her " osteoporosis.  On both the x-ray thoracic spine as well as lumbar spine it is difficult to assess the T12 compression fracture however it does appear to have more compression than prior with increase in kyphosis at that level.  There is severe levoscoliosis.  I have also reviewed the CT abdomen and pelvis to assess for the left-sided abdominal pain that the patient is having.  Given the severe scoliosis and abdominal contracture there may be some compressive component due to the levoscoliosis.    Assessment & Plan:   Jessica was seen today for follow-up.    Diagnoses and all orders for this visit:    Compression fracture of T12 vertebra, initial encounter  -     X-Ray Lumbar Spine AP And Lateral; Future      I had an extensive conversation with the patient and her daughter regarding treatment options for the compression fracture.  At this time the patient is able to stand with some pain however this improving.  I have adjusted the TLSO brace to be just a lumbar corset given her small stature and actually covers from the sacrum to approximately the mid thoracic spine.  Due to her seating in the wheelchair feel that this may contribute to some compression on the left side that is resulting in left-sided abdominal pain.  I do not appreciate a fracture of the ribs there.  We discussed the possibility of a kyphoplasty to help with pain however her pain is improving.  I would recommend continued conservative management with bracing.  If she has any increase in pain we can consider a vertebroplasty versus kyphoplasty.  I have discussed this as well as the follow-up lumbar imaging with her daughter.  She will contact us for any worsening.

## 2020-02-07 RX ORDER — NITROGLYCERIN 0.4 MG/1
TABLET SUBLINGUAL
Qty: 25 TABLET | Refills: 4 | Status: SHIPPED | OUTPATIENT
Start: 2020-02-07

## 2020-11-09 ENCOUNTER — OFFICE VISIT (OUTPATIENT)
Dept: URGENT CARE | Facility: CLINIC | Age: 85
End: 2020-11-09
Payer: MEDICARE

## 2020-11-09 ENCOUNTER — TELEPHONE (OUTPATIENT)
Dept: ORTHOPEDICS | Facility: CLINIC | Age: 85
End: 2020-11-09

## 2020-11-09 ENCOUNTER — HOSPITAL ENCOUNTER (OUTPATIENT)
Dept: RADIOLOGY | Facility: HOSPITAL | Age: 85
Discharge: HOME | End: 2020-11-09
Attending: ORTHOPAEDIC SURGERY
Payer: MEDICARE

## 2020-11-09 ENCOUNTER — OFFICE VISIT (OUTPATIENT)
Dept: ORTHOPEDICS | Facility: CLINIC | Age: 85
End: 2020-11-09
Payer: MEDICARE

## 2020-11-09 VITALS
RESPIRATION RATE: 15 BRPM | DIASTOLIC BLOOD PRESSURE: 82 MMHG | BODY MASS INDEX: 18.55 KG/M2 | WEIGHT: 92 LBS | OXYGEN SATURATION: 98 % | SYSTOLIC BLOOD PRESSURE: 200 MMHG | HEIGHT: 59 IN | TEMPERATURE: 98 F | HEART RATE: 82 BPM

## 2020-11-09 VITALS
WEIGHT: 92.56 LBS | HEART RATE: 75 BPM | SYSTOLIC BLOOD PRESSURE: 216 MMHG | DIASTOLIC BLOOD PRESSURE: 100 MMHG | BODY MASS INDEX: 18.66 KG/M2 | HEIGHT: 59 IN

## 2020-11-09 DIAGNOSIS — M79.671 RIGHT FOOT PAIN: Primary | ICD-10-CM

## 2020-11-09 DIAGNOSIS — I16.0 HYPERTENSIVE URGENCY: Primary | ICD-10-CM

## 2020-11-09 DIAGNOSIS — S92.344A CLOSED NONDISPLACED FRACTURE OF FOURTH METATARSAL BONE OF RIGHT FOOT, INITIAL ENCOUNTER: Primary | ICD-10-CM

## 2020-11-09 DIAGNOSIS — M79.671 RIGHT FOOT PAIN: ICD-10-CM

## 2020-11-09 DIAGNOSIS — R03.0 ELEVATED BLOOD PRESSURE READING: ICD-10-CM

## 2020-11-09 PROCEDURE — 99213 OFFICE O/P EST LOW 20 MIN: CPT | Mod: S$GLB,,, | Performed by: PHYSICIAN ASSISTANT

## 2020-11-09 PROCEDURE — 99203 PR OFFICE/OUTPT VISIT, NEW, LEVL III, 30-44 MIN: ICD-10-PCS | Mod: 57,S$PBB,, | Performed by: ORTHOPAEDIC SURGERY

## 2020-11-09 PROCEDURE — 99213 PR OFFICE/OUTPT VISIT, EST, LEVL III, 20-29 MIN: ICD-10-PCS | Mod: S$GLB,,, | Performed by: PHYSICIAN ASSISTANT

## 2020-11-09 PROCEDURE — 99999 PR PBB SHADOW E&M-EST. PATIENT-LVL III: ICD-10-PCS | Mod: PBBFAC,,, | Performed by: ORTHOPAEDIC SURGERY

## 2020-11-09 PROCEDURE — 28470 CLTX METATARSAL FX WO MNP EA: CPT | Mod: PBBFAC,PN | Performed by: ORTHOPAEDIC SURGERY

## 2020-11-09 PROCEDURE — 28470 PR CLOSED RX METATARSAL FX: ICD-10-PCS | Mod: S$PBB,RT,, | Performed by: ORTHOPAEDIC SURGERY

## 2020-11-09 PROCEDURE — 99999 PR PBB SHADOW E&M-EST. PATIENT-LVL III: CPT | Mod: PBBFAC,,, | Performed by: ORTHOPAEDIC SURGERY

## 2020-11-09 PROCEDURE — 73630 X-RAY EXAM OF FOOT: CPT | Mod: 26,RT,, | Performed by: RADIOLOGY

## 2020-11-09 PROCEDURE — 28470 CLTX METATARSAL FX WO MNP EA: CPT | Mod: S$PBB,RT,, | Performed by: ORTHOPAEDIC SURGERY

## 2020-11-09 PROCEDURE — 73630 X-RAY EXAM OF FOOT: CPT | Mod: TC,PO,RT

## 2020-11-09 PROCEDURE — 73630 XR FOOT COMPLETE 3 VIEW RIGHT: ICD-10-PCS | Mod: 26,RT,, | Performed by: RADIOLOGY

## 2020-11-09 PROCEDURE — 99203 OFFICE O/P NEW LOW 30 MIN: CPT | Mod: 57,S$PBB,, | Performed by: ORTHOPAEDIC SURGERY

## 2020-11-09 PROCEDURE — 99213 OFFICE O/P EST LOW 20 MIN: CPT | Mod: PBBFAC,25,PN | Performed by: ORTHOPAEDIC SURGERY

## 2020-11-09 NOTE — PROGRESS NOTES
"Subjective:       Patient ID: Jessiac Macdonald is a 89 y.o. female.    Vitals:  height is 4' 11" (1.499 m) and weight is 41.7 kg (92 lb). Her temperature is 98.2 °F (36.8 °C). Her blood pressure is 200/82 (abnormal) and her pulse is 82. Her respiration is 15 and oxygen saturation is 98%.     Chief Complaint: Hypertension    She went to the orthopedic at 2pm today for a foot fracture and her blood pressure was 215/105 with taking her medication around 8am. She said she feels fine.    Hypertension  This is a new problem. The current episode started today. The problem is unchanged. Pertinent negatives include no blurred vision, chest pain, headaches or shortness of breath. There are no associated agents to hypertension. Risk factors for coronary artery disease include sedentary lifestyle and stress.       Constitution: Negative for chills, fatigue and fever.   HENT: Negative for congestion and sore throat.    Neck: Negative for painful lymph nodes.   Cardiovascular: Negative for chest pain and leg swelling.        High blood pressure   Eyes: Negative for double vision and blurred vision.   Respiratory: Negative for cough and shortness of breath.    Gastrointestinal: Positive for abdominal pain (luq). Negative for nausea, vomiting and diarrhea.   Genitourinary: Negative for dysuria, frequency, urgency and history of kidney stones.   Musculoskeletal: Negative for joint pain, joint swelling, muscle cramps and muscle ache.   Skin: Negative for color change, pale, rash, erythema and bruising.   Allergic/Immunologic: Negative for seasonal allergies.   Neurological: Positive for altered mental status (baseline). Negative for dizziness, history of vertigo, light-headedness, passing out and headaches.   Hematologic/Lymphatic: Negative for swollen lymph nodes.   Psychiatric/Behavioral: Positive for altered mental status (baseline). Negative for nervous/anxious, sleep disturbance and depression. The patient is not " nervous/anxious.        Objective:      Physical Exam   Constitutional: She does not appear ill. No distress.   HENT:   Head: Normocephalic and atraumatic.   Ears:   Right Ear: External ear normal.   Left Ear: External ear normal.   Eyes: Conjunctivae are normal. Right eye exhibits no discharge. Left eye exhibits no discharge. extraocular movement intact  Cardiovascular: Normal rate, regular rhythm and normal heart sounds.   No murmur heard.  Pulmonary/Chest: Effort normal and breath sounds normal.   Abdominal: Soft. Normal appearance. There is no guarding.   Neurological: She is alert.   Skin: Skin is warm and dry. erythemajaundicePsychiatric: Her behavior is normal. Mood, judgment and thought content normal.   Nursing note and vitals reviewed.        Assessment:       1. Hypertensive urgency    2. Elevated blood pressure reading        Plan:         Hypertensive urgency    Elevated blood pressure reading    Discussed with supervising MD Dr. Carrasquillo. Will send to ED for further workup at this time.    Called [facility] stand alone ED to report.

## 2020-11-09 NOTE — TELEPHONE ENCOUNTER
Called Arabella with Dr. Martel ([facility])back. Advised that Dr. Vazquez is the hand specialist, Dr. Treviño is the foot and ankle specialist. Scheduled appt for this afternoon for pt to see Dr. Treviño. Arabella states that pt has a right second toe dislocation. Unsure of the injury, pt reported it on Friday. Has bruising to the foot. They did take an xray at [facility]. Will get weight bearing xray here today if possible. Thanks, Brigid

## 2020-11-09 NOTE — TELEPHONE ENCOUNTER
----- Message from Zahra Pastrana LPN sent at 11/9/2020  9:52 AM CST -----  Regarding: FW: Dislocated toe  Contact: LPN    ----- Message -----  From: Leela Arredondo  Sent: 11/9/2020   9:47 AM CST  To: [facility] Staff  Subject: Dislocated toe                                   Type: Needs Medical Advice  Who Called: Arabella from Dr Tavera office    Best Call Back Number: Can be reached at   Additional Information: the nurse from Dr Tavera office said the patient needs to be seen asap for a dislocated toe please call to advise asap per nurse

## 2020-11-09 NOTE — PROGRESS NOTES
Subjective:      Patient ID: Jessica Macdonald is a 89 y.o. female.    Chief Complaint: Pain of the Right Foot    Pt was referred to me by Dr. Tavera. She lives at [facility].  No history of injury or trauma but pt reports that she falls often. She notices bruising on 11/6/20. She rates her pain as 9/10 today.     Social History     Occupational History    Not on file   Tobacco Use    Smoking status: Former Smoker    Smokeless tobacco: Never Used    Tobacco comment: from about ages [age]   Substance and Sexual Activity    Alcohol use: Yes     Comment: 1 small glass of wine weekly    Drug use: No    Sexual activity: Not on file            Objective:    Ortho Exam     BP: 203/106  GEN: no apparent distress, alert and oriented  RLE: neurovascularly intact, moderate swelling and ecchymosis. Very tender to palpation at the 4th metatarsal neck. Longstanding 2nd toe crossing over great toe.       XRAYS: 3 views of right foot obtained and reviewed today reveal  Severely osteopenic bone, non-displaced, subtle fracture of the 4th metatarsal neck. 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         Assessment:       1. Closed nondisplaced fracture of fourth metatarsal bone of right foot, initial encounter          Plan:       Non-operative treatment. Weight bearing as tolerated in darco shoe which was given today.   I explained to she and her caregiver from [facility] and her daughter via phone, that she needs to go to [facility] ER now to have them evaluate her very elevated BP.   F/u with me in 3 weeks with xray of the right foot.

## 2020-11-11 ENCOUNTER — PATIENT MESSAGE (OUTPATIENT)
Dept: ORTHOPEDICS | Facility: CLINIC | Age: 85
End: 2020-11-11

## 2020-11-30 DIAGNOSIS — S92.344A CLOSED NONDISPLACED FRACTURE OF FOURTH METATARSAL BONE OF RIGHT FOOT, INITIAL ENCOUNTER: Primary | ICD-10-CM

## 2020-12-01 ENCOUNTER — TELEPHONE (OUTPATIENT)
Dept: ORTHOPEDICS | Facility: CLINIC | Age: 85
End: 2020-12-01

## 2020-12-01 NOTE — TELEPHONE ENCOUNTER
----- Message from Darling Arzate sent at 12/1/2020 12:41 PM CST -----  Contact: Pt's Daughter  Patient: Jessica Macdonald  Calling: Pt's daughter  Phone: [phone]    Pt has appointment scheduled with Dr. Treviño today 12/1 and  has x ray scheduled for today but fell earlier and is no longer going to be available to come to the appointment. Pt's daughter requesting call back to reschedule. Thank you

## 2020-12-01 NOTE — TELEPHONE ENCOUNTER
Attempted to call daughter back. No answer and no voicemail picked up. Will send My Chart Message. Thanks, Brigid

## 2020-12-03 ENCOUNTER — PATIENT MESSAGE (OUTPATIENT)
Dept: ORTHOPEDICS | Facility: CLINIC | Age: 85
End: 2020-12-03

## 2020-12-10 ENCOUNTER — OFFICE VISIT (OUTPATIENT)
Dept: ORTHOPEDICS | Facility: CLINIC | Age: 85
End: 2020-12-10
Payer: MEDICARE

## 2020-12-10 ENCOUNTER — HOSPITAL ENCOUNTER (OUTPATIENT)
Dept: RADIOLOGY | Facility: HOSPITAL | Age: 85
Discharge: HOME | End: 2020-12-10
Attending: ORTHOPAEDIC SURGERY
Payer: MEDICARE

## 2020-12-10 VITALS
HEIGHT: 59 IN | DIASTOLIC BLOOD PRESSURE: 100 MMHG | WEIGHT: 91 LBS | HEART RATE: 89 BPM | SYSTOLIC BLOOD PRESSURE: 201 MMHG | BODY MASS INDEX: 18.35 KG/M2

## 2020-12-10 DIAGNOSIS — S92.344A CLOSED NONDISPLACED FRACTURE OF FOURTH METATARSAL BONE OF RIGHT FOOT, INITIAL ENCOUNTER: ICD-10-CM

## 2020-12-10 DIAGNOSIS — S92.344A CLOSED NONDISPLACED FRACTURE OF FOURTH METATARSAL BONE OF RIGHT FOOT, INITIAL ENCOUNTER: Primary | ICD-10-CM

## 2020-12-10 PROCEDURE — 99024 POSTOP FOLLOW-UP VISIT: CPT | Mod: S$PBB,,, | Performed by: ORTHOPAEDIC SURGERY

## 2020-12-10 PROCEDURE — 73630 XR FOOT COMPLETE 3 VIEW RIGHT: ICD-10-PCS | Mod: 26,RT,, | Performed by: RADIOLOGY

## 2020-12-10 PROCEDURE — 99999 PR PBB SHADOW E&M-EST. PATIENT-LVL IV: CPT | Mod: PBBFAC,,, | Performed by: ORTHOPAEDIC SURGERY

## 2020-12-10 PROCEDURE — 99999 PR PBB SHADOW E&M-EST. PATIENT-LVL IV: ICD-10-PCS | Mod: PBBFAC,,, | Performed by: ORTHOPAEDIC SURGERY

## 2020-12-10 PROCEDURE — 73630 X-RAY EXAM OF FOOT: CPT | Mod: 26,RT,, | Performed by: RADIOLOGY

## 2020-12-10 PROCEDURE — 73630 X-RAY EXAM OF FOOT: CPT | Mod: TC,PO,RT

## 2020-12-10 PROCEDURE — 99024 PR POST-OP FOLLOW-UP VISIT: ICD-10-PCS | Mod: S$PBB,,, | Performed by: ORTHOPAEDIC SURGERY

## 2020-12-10 PROCEDURE — 99214 OFFICE O/P EST MOD 30 MIN: CPT | Mod: PBBFAC,25,PN | Performed by: ORTHOPAEDIC SURGERY

## 2020-12-10 NOTE — PROGRESS NOTES
Subjective:      Patient ID: Jessica Macdonald is a 89 y.o. female.    Chief Complaint: Injury of the Right Foot (DOI- 11/06/2020)     She notices bruising on 11/6/20. She rates her pain as 7/10 today but it is in the leg where she had a skin tear. The foot is no longer painful.     Social History     Occupational History    Not on file   Tobacco Use    Smoking status: Former Smoker    Smokeless tobacco: Never Used    Tobacco comment: from about ages [age]   Substance and Sexual Activity    Alcohol use: Yes     Comment: 1 small glass of wine weekly    Drug use: No    Sexual activity: Not on file            Objective:    Ortho Exam     BP: 201/100  GEN: no apparent distress, alert and oriented  RLE: neurovascularly intact, minimal swelling of the foot, no ecchymosis. Non-tender to palpation at the 4th metatarsal neck. Longstanding 2nd toe crossing over great toe.       XRAYS: 3 views of right foot obtained and reviewed today reveal  Severely osteopenic bone, No evidence of fractures or dislocations. . 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         Assessment:    s/p right 4th metatarsal fracture  Plan:       Weight bearing as tolerated in regular shoe. I referred her to Dr. Madison for her leg wound.

## 2020-12-11 ENCOUNTER — TELEPHONE (OUTPATIENT)
Dept: PODIATRY | Facility: CLINIC | Age: 85
End: 2020-12-11

## 2020-12-14 ENCOUNTER — TELEPHONE (OUTPATIENT)
Dept: PODIATRY | Facility: CLINIC | Age: 85
End: 2020-12-14

## 2020-12-14 NOTE — TELEPHONE ENCOUNTER
Called and offered an appt, Dee declined the appt and said they can do the wound care at [facility].

## 2021-01-29 RX ORDER — CLOPIDOGREL BISULFATE 75 MG/1
TABLET ORAL
Qty: 90 TABLET | Refills: 4 | Status: SHIPPED | OUTPATIENT
Start: 2021-01-29 | End: 2022-02-21

## 2021-05-06 ENCOUNTER — PATIENT MESSAGE (OUTPATIENT)
Dept: RESEARCH | Facility: HOSPITAL | Age: 86
End: 2021-05-06

## 2022-02-21 RX ORDER — CLOPIDOGREL BISULFATE 75 MG/1
TABLET ORAL
Qty: 90 TABLET | Refills: 4 | Status: SHIPPED | OUTPATIENT
Start: 2022-02-21 | End: 2023-04-10

## 2022-08-15 PROBLEM — Z71.89 ACP (ADVANCE CARE PLANNING): Status: ACTIVE | Noted: 2022-08-15

## 2022-08-15 PROBLEM — S72.001A CLOSED RIGHT HIP FRACTURE: Status: ACTIVE | Noted: 2019-04-01

## 2022-09-04 ENCOUNTER — PATIENT MESSAGE (OUTPATIENT)
Dept: ORTHOPEDICS | Facility: CLINIC | Age: 87
End: 2022-09-04
Payer: MEDICARE

## 2022-09-07 ENCOUNTER — TELEPHONE (OUTPATIENT)
Dept: ORTHOPEDICS | Facility: CLINIC | Age: 87
End: 2022-09-07
Payer: MEDICARE

## 2022-09-07 NOTE — TELEPHONE ENCOUNTER
----- Message from Levi Maloney sent at 9/7/2022 10:46 AM CDT -----  Regarding: pt needs post op appt and NH needs to speak to staff call [facility]   Contact: [facility] at [facility]   pt needs post op appt and NH needs to speak to staff call [place] [phone]

## 2022-09-08 ENCOUNTER — PATIENT MESSAGE (OUTPATIENT)
Dept: ORTHOPEDICS | Facility: CLINIC | Age: 87
End: 2022-09-08

## 2022-09-08 ENCOUNTER — HOSPITAL ENCOUNTER (OUTPATIENT)
Dept: RADIOLOGY | Facility: HOSPITAL | Age: 87
Discharge: HOME | End: 2022-09-08
Attending: ORTHOPAEDIC SURGERY
Payer: MEDICARE

## 2022-09-08 ENCOUNTER — TELEPHONE (OUTPATIENT)
Dept: ORTHOPEDICS | Facility: CLINIC | Age: 87
End: 2022-09-08

## 2022-09-08 ENCOUNTER — OFFICE VISIT (OUTPATIENT)
Dept: ORTHOPEDICS | Facility: CLINIC | Age: 87
End: 2022-09-08
Payer: MEDICARE

## 2022-09-08 VITALS — WEIGHT: 100 LBS | BODY MASS INDEX: 19.63 KG/M2 | HEIGHT: 60 IN

## 2022-09-08 DIAGNOSIS — S72.001A CLOSED DISPLACED FRACTURE OF RIGHT FEMORAL NECK: Primary | ICD-10-CM

## 2022-09-08 DIAGNOSIS — S72.001A CLOSED DISPLACED FRACTURE OF RIGHT FEMORAL NECK: ICD-10-CM

## 2022-09-08 PROCEDURE — 99024 POSTOP FOLLOW-UP VISIT: CPT | Mod: POP,,, | Performed by: ORTHOPAEDIC SURGERY

## 2022-09-08 PROCEDURE — 99999 PR PBB SHADOW E&M-EST. PATIENT-LVL III: ICD-10-PCS | Mod: PBBFAC,,, | Performed by: ORTHOPAEDIC SURGERY

## 2022-09-08 PROCEDURE — 99024 PR POST-OP FOLLOW-UP VISIT: ICD-10-PCS | Mod: POP,,, | Performed by: ORTHOPAEDIC SURGERY

## 2022-09-08 PROCEDURE — 73502 X-RAY EXAM HIP UNI 2-3 VIEWS: CPT | Mod: TC,PO,RT

## 2022-09-08 PROCEDURE — 99213 OFFICE O/P EST LOW 20 MIN: CPT | Mod: PBBFAC,PN | Performed by: ORTHOPAEDIC SURGERY

## 2022-09-08 PROCEDURE — 99999 PR PBB SHADOW E&M-EST. PATIENT-LVL III: CPT | Mod: PBBFAC,,, | Performed by: ORTHOPAEDIC SURGERY

## 2022-09-08 PROCEDURE — 73502 X-RAY EXAM HIP UNI 2-3 VIEWS: CPT | Mod: 26,RT,, | Performed by: RADIOLOGY

## 2022-09-08 PROCEDURE — 73502 XR HIP WITH PELVIS WHEN PERFORMED, 2 OR 3  VIEWS RIGHT: ICD-10-PCS | Mod: 26,RT,, | Performed by: RADIOLOGY

## 2022-09-08 NOTE — TELEPHONE ENCOUNTER
----- Message from Ortega Dick sent at 9/8/2022  6:15 AM CDT -----  Contact: Patient  The following request was sent through the pt portal    Appointment Request From: Jessica Macdonald    With Provider: Anil Brower MD [[facility]]    Preferred Date Range: 9/13/2022 - 9/15/2022    Preferred Times: Any Time    Reason for visit: Office Visit    Comments:  Ability to return home instead of skilled nursing    The pt can be reached at [phone]

## 2022-10-17 DIAGNOSIS — S72.001A CLOSED DISPLACED FRACTURE OF RIGHT FEMORAL NECK: Primary | ICD-10-CM

## 2022-10-20 ENCOUNTER — OFFICE VISIT (OUTPATIENT)
Dept: ORTHOPEDICS | Facility: CLINIC | Age: 87
End: 2022-10-20
Payer: MEDICARE

## 2022-10-20 ENCOUNTER — HOSPITAL ENCOUNTER (OUTPATIENT)
Dept: RADIOLOGY | Facility: HOSPITAL | Age: 87
Discharge: HOME | End: 2022-10-20
Attending: NURSE PRACTITIONER
Payer: MEDICARE

## 2022-10-20 DIAGNOSIS — Z87.81 S/P ORIF (OPEN REDUCTION INTERNAL FIXATION) FRACTURE: ICD-10-CM

## 2022-10-20 DIAGNOSIS — Z87.81 S/P RIGHT HIP FRACTURE: Primary | ICD-10-CM

## 2022-10-20 DIAGNOSIS — M70.62 TROCHANTERIC BURSITIS OF BOTH HIPS: ICD-10-CM

## 2022-10-20 DIAGNOSIS — S72.001A CLOSED DISPLACED FRACTURE OF RIGHT FEMORAL NECK: ICD-10-CM

## 2022-10-20 DIAGNOSIS — M70.61 TROCHANTERIC BURSITIS OF BOTH HIPS: ICD-10-CM

## 2022-10-20 DIAGNOSIS — Z98.890 S/P ORIF (OPEN REDUCTION INTERNAL FIXATION) FRACTURE: ICD-10-CM

## 2022-10-20 PROCEDURE — 73502 XR HIP WITH PELVIS WHEN PERFORMED, 2 OR 3  VIEWS RIGHT: ICD-10-PCS | Mod: 26,RT,, | Performed by: RADIOLOGY

## 2022-10-20 PROCEDURE — 99024 POSTOP FOLLOW-UP VISIT: CPT | Mod: POP,,, | Performed by: NURSE PRACTITIONER

## 2022-10-20 PROCEDURE — 73502 X-RAY EXAM HIP UNI 2-3 VIEWS: CPT | Mod: TC,PO,RT

## 2022-10-20 PROCEDURE — 99999 PR PBB SHADOW E&M-EST. PATIENT-LVL III: CPT | Mod: PBBFAC,,, | Performed by: NURSE PRACTITIONER

## 2022-10-20 PROCEDURE — 20610 DRAIN/INJ JOINT/BURSA W/O US: CPT | Mod: 50,79,S$PBB, | Performed by: NURSE PRACTITIONER

## 2022-10-20 PROCEDURE — 99999 PR PBB SHADOW E&M-EST. PATIENT-LVL III: ICD-10-PCS | Mod: PBBFAC,,, | Performed by: NURSE PRACTITIONER

## 2022-10-20 PROCEDURE — 20610 LARGE JOINT ASPIRATION/INJECTION: BILATERAL GREATER TROCHANTERIC BURSA: ICD-10-PCS | Mod: 50,79,S$PBB, | Performed by: NURSE PRACTITIONER

## 2022-10-20 PROCEDURE — 73502 X-RAY EXAM HIP UNI 2-3 VIEWS: CPT | Mod: 26,RT,, | Performed by: RADIOLOGY

## 2022-10-20 PROCEDURE — 99024 PR POST-OP FOLLOW-UP VISIT: ICD-10-PCS | Mod: POP,,, | Performed by: NURSE PRACTITIONER

## 2022-10-20 PROCEDURE — 99213 OFFICE O/P EST LOW 20 MIN: CPT | Mod: PBBFAC,PN,25 | Performed by: NURSE PRACTITIONER

## 2022-10-20 PROCEDURE — 20610 DRAIN/INJ JOINT/BURSA W/O US: CPT | Performed by: NURSE PRACTITIONER

## 2022-10-20 RX ORDER — TRIAMCINOLONE ACETONIDE 40 MG/ML
40 INJECTION, SUSPENSION INTRA-ARTICULAR; INTRAMUSCULAR
Status: DISCONTINUED | OUTPATIENT
Start: 2022-10-20 | End: 2022-10-20 | Stop reason: HOSPADM

## 2022-10-20 RX ADMIN — TRIAMCINOLONE ACETONIDE 40 MG: 40 INJECTION, SUSPENSION INTRA-ARTICULAR; INTRAMUSCULAR at 08:10

## 2022-10-20 NOTE — PROCEDURES
Large Joint Aspiration/Injection: bilateral greater trochanteric bursa    Date/Time: 10/20/2022 8:40 AM  Performed by: JEY Woodall  Authorized by: JEY Woodall     Consent Done?:  Yes (Verbal)  Indications:  Pain  Timeout: prior to procedure the correct patient, procedure, and site was verified    Prep: patient was prepped and draped in usual sterile fashion      Local anesthesia used?: Yes    Local anesthetic:  Lidocaine 1% without epinephrine  Anesthetic total (ml):  5      Details:  Needle Size:  21 G  Approach:  Lateral  Location:  Hip  Laterality:  Bilateral  Site:  Bilateral greater trochanteric bursa  Medications (Right):  40 mg triamcinolone acetonide 40 mg/mL  Medications (Left):  40 mg triamcinolone acetonide 40 mg/mL  Patient tolerance:  Patient tolerated the procedure well with no immediate complications

## 2022-10-20 NOTE — PROGRESS NOTES
Chief Complaint   Patient presents with    Right Hip - Pain, Post-op Evaluation       HPI:  89 y.o. pleasant,elderly, [place], [place] returns to clinic today status post  right hip nailing  9 weeks ago. Pain is  affecting her ability to walk . Patient is compliant most of the time with restrictions.       Right Hip Exam   Tenderness   The patient is experiencing NO tenderness in the groin.     Range of Motion   The patient has NO decreased internal rotation  hip.    Muscle Strength   Flexion: 4/5     Other   Erythema: absent  Sensation: normal  Pulse: present    Left Hip Exam   Hip exam performed same as contralateral side and is normal      X-rays were performed today, personally reviewed by me and findings discussed with the patient.  2 views of the right hip show s/p hip nailing of right hip. No acute changes from previous imaging. L hip with prosthesis intact.       Bilateral Hip Exam Performed    Bilateral Hip Exam     Tenderness   The patient is experiencing tenderness in the greater trochanter.    Range of Motion   The patient has normal hip ROM.    Muscle Strength   Abduction: 4/5     Other   Erythema: absent  Sensation: normal  Pulse: present    Bilateral Hip Exam   Hip exam performed same as contralateral hip and is normal.         Assessment and plan:    S/P right hip fracture    S/P ORIF (open reduction internal fixation) fracture    Trochanteric bursitis of both hips  -     Large Joint Aspiration/Injection: bilateral greater trochanteric bursa      Using an aseptic technique, I injected 5 cc of lidocaine 1% without and 1 cc of kenalog 40mg into the bilateral Hip. The patient tolerated this well.     Continue methocarbamol (muscle relaxer) 750 mg tablet by mouth prior to physical therapy and one tablet at bedtime.     She does have some tenderness to hip bursa, hip flexor tendon, and adductor tendon that PT would definitely benefit her with this.     RTC in 4 months.

## 2022-11-07 DIAGNOSIS — R10.30 LOWER ABDOMINAL PAIN, UNSPECIFIED: Primary | ICD-10-CM

## 2023-02-15 DIAGNOSIS — Z87.81 S/P RIGHT HIP FRACTURE: Primary | ICD-10-CM

## 2023-02-23 ENCOUNTER — OFFICE VISIT (OUTPATIENT)
Dept: ORTHOPEDICS | Facility: CLINIC | Age: 88
End: 2023-02-23
Payer: MEDICARE

## 2023-02-23 ENCOUNTER — HOSPITAL ENCOUNTER (OUTPATIENT)
Dept: RADIOLOGY | Facility: HOSPITAL | Age: 88
Discharge: HOME | End: 2023-02-23
Attending: NURSE PRACTITIONER
Payer: MEDICARE

## 2023-02-23 VITALS — BODY MASS INDEX: 19.65 KG/M2 | WEIGHT: 100.06 LBS | HEIGHT: 60 IN

## 2023-02-23 DIAGNOSIS — Z87.81 S/P RIGHT HIP FRACTURE: Primary | ICD-10-CM

## 2023-02-23 DIAGNOSIS — Z87.81 S/P RIGHT HIP FRACTURE: ICD-10-CM

## 2023-02-23 PROCEDURE — 73502 X-RAY EXAM HIP UNI 2-3 VIEWS: CPT | Mod: 26,RT,, | Performed by: RADIOLOGY

## 2023-02-23 PROCEDURE — 73502 X-RAY EXAM HIP UNI 2-3 VIEWS: CPT | Mod: TC,PO,RT

## 2023-02-23 PROCEDURE — 73502 XR HIP WITH PELVIS WHEN PERFORMED, 2 OR 3  VIEWS RIGHT: ICD-10-PCS | Mod: 26,RT,, | Performed by: RADIOLOGY

## 2023-02-23 PROCEDURE — 99213 PR OFFICE/OUTPT VISIT, EST, LEVL III, 20-29 MIN: ICD-10-PCS | Mod: S$PBB,,, | Performed by: NURSE PRACTITIONER

## 2023-02-23 PROCEDURE — 99999 PR PBB SHADOW E&M-EST. PATIENT-LVL III: ICD-10-PCS | Mod: PBBFAC,,, | Performed by: NURSE PRACTITIONER

## 2023-02-23 PROCEDURE — 99999 PR PBB SHADOW E&M-EST. PATIENT-LVL III: CPT | Mod: PBBFAC,,, | Performed by: NURSE PRACTITIONER

## 2023-02-23 PROCEDURE — 99213 OFFICE O/P EST LOW 20 MIN: CPT | Mod: S$PBB,,, | Performed by: NURSE PRACTITIONER

## 2023-02-23 PROCEDURE — 99213 OFFICE O/P EST LOW 20 MIN: CPT | Mod: PBBFAC,PN | Performed by: NURSE PRACTITIONER

## 2023-02-23 RX ORDER — METHOCARBAMOL 750 MG/1
750 TABLET, FILM COATED ORAL 4 TIMES DAILY PRN
Qty: 120 TABLET | Refills: 0 | Status: SHIPPED | OUTPATIENT
Start: 2023-02-23 | End: 2023-04-10

## 2023-02-23 NOTE — PROGRESS NOTES
Chief Complaint   Patient presents with    Right Hip - Pain       HPI:   This is a 89 y.o. who returns to clinic today in follow-up for right hip pinning s/p right hip fracture approximately 4 months ago. Pain is aching. No numbness or tingling. No associated signs or symptoms.    Past Medical History:   Diagnosis Date    AS (aortic stenosis) 9/17/2018 6/2018 CHIKA 1.68    CAD (coronary artery disease) 6/24/2014 6/2014 LHC- LAD- 85% prox and mid, CX-NSD, RCA-65% prox     Closed left hip fracture 4/1/2019    Hypertension     S/P coronary artery stent placement 6/24/2014 6/2014 LAD- veriflex 3x16 and promus 2.75x38     Sjoegren syndrome     Ulcer      Past Surgical History:   Procedure Laterality Date    APPENDECTOMY      APPLICATION OF SPLINT Left 8/9/2019    Procedure: APPLICATION, SPLINT;  Surgeon: Osmel Vazquez MD;  Location: [facility] OR;  Service: Orthopedics;  Laterality: Left;    CATARACT EXTRACTION Bilateral     CHOLECYSTECTOMY      CORONARY STENT PLACEMENT      HEMIARTHROPLASTY OF HIP Left 4/1/2019    Procedure: HEMIARTHROPLASTY, HIP;  Surgeon: Pete Yusuf II, MD;  Location: [facility] OR;  Service: Orthopedics;  Laterality: Left;    IRRIGATION AND DEBRIDEMENT OF UPPER EXTREMITY Left 8/9/2019    Procedure: IRRIGATION AND DEBRIDEMENT, LOWER EXTREMITY, OPEN FRACTURE;  Surgeon: Osmel Vazquez MD;  Location: [facility] OR;  Service: Orthopedics;  Laterality: Left;    MOLE REMOVAL      forehead    OPEN REDUCTION AND INTERNAL FIXATION (ORIF) OF FRACTURE OF DISTAL RADIUS Left 8/9/2019    Procedure: ORIF, FRACTURE, RADIUS, DISTAL;  Surgeon: Osmel Vazquez MD;  Location: [facility] OR;  Service: Orthopedics;  Laterality: Left;    OPEN REDUCTION AND INTERNAL FIXATION (ORIF) OF FRACTURE OF ULNA Left 8/9/2019    Procedure: ORIF, FRACTURE, ULNA;  Surgeon: Osmel Vazquez MD;  Location: [facility] OR;  Service: Orthopedics;  Laterality: Left;    PARTIAL HYSTERECTOMY      PERCUTANEOUS PINNING OF HIP Right 8/15/2022     Procedure: PINNING, HIP, PERCUTANEOUS;  Surgeon: Anil Brower MD;  Location: [facility];  Service: Orthopedics;  Laterality: Right;     Current Outpatient Medications on File Prior to Visit   Medication Sig Dispense Refill    amLODIPine (NORVASC) 5 MG tablet Take 5 mg by mouth daily as needed (if systolic is over 160).      apixaban (ELIQUIS) 2.5 mg Tab Take 1 tablet (2.5 mg total) by mouth 2 (two) times daily. 70 tablet 0    aspirin (ECOTRIN) 81 MG EC tablet Take 81 mg by mouth every Mon, Wed, Fri.       calcium carbonate (OS-ASH) 500 mg calcium (1,250 mg) tablet Take 1 tablet by mouth once daily.      clopidogreL (PLAVIX) 75 mg tablet TAKE ONE-HALF TABLET BY MOUTH EVERY MONDAY, WEDNESDAY, AND FRIDAY (Patient taking differently: Take 37.5 mg by mouth every Mon, Wed, Fri.) 90 tablet 4    cranberry fruit extract (CRANBERRY ORAL) Take 1 tablet by mouth once daily.      diltiaZEM (CARDIZEM CD) 120 MG Cp24 Take 1 capsule (120 mg total) by mouth nightly. 30 capsule 11    diphenhydrAMINE (BENADRYL) 25 mg capsule Take 25 mg by mouth 3 (three) times daily as needed for Itching.      docusate sodium (COLACE) 100 MG capsule Take 100-300 mg by mouth daily as needed for Constipation.      famotidine (PEPCID) 40 MG tablet Take 40 mg by mouth nightly.      HYDROcodone-acetaminophen (NORCO) 5-325 mg per tablet Take 1 tablet by mouth nightly as needed for Pain. 7 tablet 0    LORazepam (ATIVAN) 0.5 MG tablet Take 1 tablet (0.5 mg total) by mouth 2 (two) times daily as needed. 30 tablet 0    nitroGLYCERIN (NITROSTAT) 0.4 MG SL tablet Place ONE tablet (0.4 MG total) UNDER THE TONGUE every FIVE (five) minutes AS needed FOR CHEST PAIN. (Patient taking differently: Place 0.4 mg under the tongue every 5 (five) minutes as needed for Chest pain. MAXIMUM OF 3 DOSES IN 15 MINUTES) 25 tablet 4    pantoprazole (PROTONIX) 40 MG tablet Take 40 mg by mouth once daily.  0    propylene glycoL 0.6 % Drop Place 1 drop into both eyes 3 (three) times  daily as needed (dry eye).       ROCKLATAN 0.02-0.005 % Drop Place 1 drop into both eyes once daily.       [DISCONTINUED] brimonidine-timoloL (COMBIGAN) 0.2-0.5 % Drop Place 1 drop into the left eye 2 (two) times daily.       Current Facility-Administered Medications on File Prior to Visit   Medication Dose Route Frequency Provider Last Rate Last Admin    barium sulfate (READI-CAT) suspension 900 mL  900 mL Oral Once Susan Tavera MD         Review of patient's allergies indicates:   Allergen Reactions    Ciprofloxacin hcl     Crestor [rosuvastatin]      Dizziness      Duradex [dextromethorphan-guaifenesin]      Dizziness      Levaquin [levofloxacin] Other (See Comments)     Increases blood pressure      Metronidazole Other (See Comments)    Nitrofurantoin monohyd/m-cryst Other (See Comments)    Statins-hmg-coa reductase inhibitors      myalgias    Vioxx [rofecoxib]      History reviewed. No pertinent family history.  Social History     Socioeconomic History    Marital status:    Tobacco Use    Smoking status: Former    Smokeless tobacco: Never    Tobacco comments:     from about ages [age]   Substance and Sexual Activity    Alcohol use: Yes     Comment: 1 small glass of wine weekly    Drug use: No       Review of Systems:  Constitutional:  Denies fever or chills   Eyes:  Denies change in visual acuity   HENT:  Denies nasal congestion or sore throat   Respiratory:  Denies cough or shortness of breath   Cardiovascular:  Denies chest pain or edema   GI:  Denies abdominal pain, nausea, vomiting, bloody stools or diarrhea   :  Denies dysuria   Integument:  Denies rash   Neurologic:  Denies headache, focal weakness or sensory changes   Endocrine:  Denies polyuria or polydipsia   Lymphatic:  Denies swollen glands   Psychiatric:  Denies depression or anxiety     Physical Exam:   Constitutional:  Well developed, well nourished, no acute distress, non-toxic appearance   Integument:  Well hydrated, no rash    Lymphatic:  No lymphadenopathy noted   Neurologic:  Alert & oriented x 3,    Psychiatric:  Speech and behavior appropriate     Right Hip Exam   Tenderness   The patient is experiencing no tenderness in the groin.     Range of Motion   The patient has no decreased internal rotation  hip.    Muscle Strength   Flexion: 4/5     Other   Erythema: absent  Sensation: normal  Pulse: present    Left Hip Exam   Hip exam performed same as contralateral side and is normal        X-rays were performed today, personally reviewed by me and findings discussed with the patient.  2 views of the right hip show hardware in place with interval healing of right hip fracture.             S/P right hip fracture  -     methocarbamoL (ROBAXIN) 750 MG Tab; Take 1 tablet (750 mg total) by mouth 4 (four) times daily as needed (muscle spasms or muscular pain).  Dispense: 120 tablet; Refill: 0     Enc self exercises, quad strengthening.   Take muscle relaxers prn.     Rtc as needed.

## 2023-04-09 NOTE — PROGRESS NOTES
Subjective:    Patient ID:  Jessica Macdonald is a 89 y.o. female who presents for Establish Care, Hypertension, Coronary Artery Disease, and Valvular Heart Disease        HPI  NEW PATIENT EVALUATION, WAS SEEN BY DR. RUBIO IN 2018, ECHO FROM 2019 SHOWED AORTIC STENOSIS AORTIC VALVE AREA AROUND 1.2 CM2 MODERATE MR NORMAL LV FUNCTION, MILD MITRAL STENOSIS PA PRESSURE 62 MM OF MERCURY, LABS FROM AUGUST NOTED, EKG SINUS RHYTHM BORDERLINE LVH LEFT AXIS DEVIATION, HAS BEEN HAVING BP VARIATION, DOES NOT LIKE TO GET OUT PER DAUGHTER,HAS BEEN OFF BP[ MEDS PER PCP,  WAS ON LOW DOSE PLAVIX, EASY BRUISING, SEE ROS    Past Medical History:   Diagnosis Date    AS (aortic stenosis) 9/17/2018 6/2018 CHIKA 1.68    CAD (coronary artery disease) 6/24/2014 6/2014 LHC- LAD- 85% prox and mid, CX-NSD, RCA-65% prox     Closed left hip fracture 4/1/2019    Hypertension     S/P coronary artery stent placement 6/24/2014 6/2014 LAD- veriflex 3x16 and promus 2.75x38     Sjoegren syndrome     Ulcer      Past Surgical History:   Procedure Laterality Date    APPENDECTOMY      APPLICATION OF SPLINT Left 8/9/2019    Procedure: APPLICATION, SPLINT;  Surgeon: Osmel Vazquez MD;  Location: [facility] OR;  Service: Orthopedics;  Laterality: Left;    CATARACT EXTRACTION Bilateral     CHOLECYSTECTOMY      CORONARY STENT PLACEMENT      HEMIARTHROPLASTY OF HIP Left 4/1/2019    Procedure: HEMIARTHROPLASTY, HIP;  Surgeon: Pete Yusuf II, MD;  Location: [facility] OR;  Service: Orthopedics;  Laterality: Left;    IRRIGATION AND DEBRIDEMENT OF UPPER EXTREMITY Left 8/9/2019    Procedure: IRRIGATION AND DEBRIDEMENT, LOWER EXTREMITY, OPEN FRACTURE;  Surgeon: Osmel Vazquez MD;  Location: [facility] OR;  Service: Orthopedics;  Laterality: Left;    MOLE REMOVAL      forehead    OPEN REDUCTION AND INTERNAL FIXATION (ORIF) OF FRACTURE OF DISTAL RADIUS Left 8/9/2019    Procedure: ORIF, FRACTURE, RADIUS, DISTAL;  Surgeon: Osmel Vazquez MD;  Location: [facility]  OR;  Service: Orthopedics;  Laterality: Left;    OPEN REDUCTION AND INTERNAL FIXATION (ORIF) OF FRACTURE OF ULNA Left 8/9/2019    Procedure: ORIF, FRACTURE, ULNA;  Surgeon: Osmel Vazquez MD;  Location: [facility] OR;  Service: Orthopedics;  Laterality: Left;    PARTIAL HYSTERECTOMY      PERCUTANEOUS PINNING OF HIP Right 8/15/2022    Procedure: PINNING, HIP, PERCUTANEOUS;  Surgeon: Anil Brower MD;  Location: [facility] OR;  Service: Orthopedics;  Laterality: Right;     No family history on file.  Social History     Socioeconomic History    Marital status:    Tobacco Use    Smoking status: Former    Smokeless tobacco: Never    Tobacco comments:     from about ages [age]   Substance and Sexual Activity    Alcohol use: Yes     Comment: 1 small glass of wine weekly    Drug use: No       Review of patient's allergies indicates:   Allergen Reactions    Ciprofloxacin hcl     Crestor [rosuvastatin]      Dizziness      Duradex [dextromethorphan-guaifenesin]      Dizziness      Levaquin [levofloxacin] Other (See Comments)     Increases blood pressure      Metronidazole Other (See Comments)    Nitrofurantoin monohyd/m-cryst Other (See Comments)    Statins-hmg-coa reductase inhibitors      myalgias    Vioxx [rofecoxib]        Current Outpatient Medications:     aspirin (ECOTRIN) 81 MG EC tablet, Take 81 mg by mouth every Mon, Wed, Fri. , Disp: , Rfl:     brimonidine-timoloL (COMBIGAN) 0.2-0.5 % Drop, Place 1 drop into both eyes., Disp: , Rfl:     calcium carbonate (OS-ASH) 500 mg calcium (1,250 mg) tablet, Take 1 tablet by mouth once daily. LIQUID CALCIUM, Disp: , Rfl:     cranberry fruit concentrate (AZO CRANBERRY ORAL), Take by mouth., Disp: , Rfl:     famotidine (PEPCID) 40 MG tablet, Take 40 mg by mouth nightly., Disp: , Rfl:     nitroGLYCERIN (NITROSTAT) 0.4 MG SL tablet, Place ONE tablet (0.4 MG total) UNDER THE TONGUE every FIVE (five) minutes AS needed FOR CHEST PAIN. (Patient taking differently: Place 0.4 mg under  the tongue every 5 (five) minutes as needed for Chest pain. MAXIMUM OF 3 DOSES IN 15 MINUTES), Disp: 25 tablet, Rfl: 4    pantoprazole (PROTONIX) 40 MG tablet, Take 40 mg by mouth once daily., Disp: , Rfl: 0    propylene glycoL 0.6 % Drop, Place 1 drop into both eyes 3 (three) times daily as needed (dry eye). , Disp: , Rfl:     ROCKLATAN 0.02-0.005 % Drop, Place 1 drop into both eyes once daily. , Disp: , Rfl:     senna (SENOKOT) 8.6 mg tablet, Take 1 tablet by mouth once daily., Disp: , Rfl:     amLODIPine (NORVASC) 2.5 MG tablet, Take 1 tablet (2.5 mg total) by mouth once daily., Disp: 30 tablet, Rfl: 5    cranberry fruit extract (CRANBERRY ORAL), Take 1 tablet by mouth once daily., Disp: , Rfl:     diphenhydrAMINE (BENADRYL) 25 mg capsule, Take 25 mg by mouth 3 (three) times daily as needed for Itching., Disp: , Rfl:     docusate sodium (COLACE) 100 MG capsule, Take 100-300 mg by mouth daily as needed for Constipation., Disp: , Rfl:     HYDROcodone-acetaminophen (NORCO) 5-325 mg per tablet, Take 1 tablet by mouth nightly as needed for Pain. (Patient not taking: Reported on 4/10/2023), Disp: 7 tablet, Rfl: 0    LORazepam (ATIVAN) 0.5 MG tablet, Take 1 tablet (0.5 mg total) by mouth 2 (two) times daily as needed. (Patient not taking: Reported on 4/10/2023), Disp: 30 tablet, Rfl: 0  No current facility-administered medications for this visit.    Facility-Administered Medications Ordered in Other Visits:     barium sulfate (READI-CAT) suspension 900 mL, 900 mL, Oral, Once, Susan Tavera MD    Review of Systems   Constitutional: Negative for chills, decreased appetite, diaphoresis, fever and malaise/fatigue.   HENT:  Positive for hearing loss. Negative for congestion and nosebleeds.    Cardiovascular:  Negative for chest pain (OCC [place]), claudication, cyanosis, dyspnea on exertion (MILD), irregular heartbeat, leg swelling (ANKLES), near-syncope, orthopnea, palpitations, paroxysmal nocturnal dyspnea and syncope.    Endocrine: Negative for polyphagia and polyuria.   Hematologic/Lymphatic: Negative for adenopathy. Bruises/bleeds easily.   Musculoskeletal:  Positive for back pain. Negative for falls.   Gastrointestinal:  Positive for bloating. Negative for dysphagia, melena and nausea.   Genitourinary:  Negative for dysuria and flank pain.   Neurological:  Negative for brief paralysis, dizziness, focal weakness, numbness and paresthesias. Weakness: SOME.       MIN STROKE PER DAUGHTER 15 Y AGO   Psychiatric/Behavioral:  Negative for altered mental status and depression. Memory loss: OK.      Objective:      Vitals:    04/10/23 1331 04/10/23 1422   BP: (!) 186/88 (!) 178/82   Pulse: 85    Weight: 45.4 kg (100 lb 1.4 oz)    Height: 5' (1.524 m)    PainSc: 0-No pain      Body mass index is 19.55 kg/m².    Physical Exam  HENT:      Head: Normocephalic and atraumatic.   Eyes:      Extraocular Movements: Extraocular movements intact.      Conjunctiva/sclera: Conjunctivae normal.   Cardiovascular:      Rate and Rhythm: Normal rate and regular rhythm. No extrasystoles are present.     Pulses:           Carotid pulses are 2+ on the right side and 2+ on the left side.       Radial pulses are 2+ on the right side and 2+ on the left side.        Posterior tibial pulses are 2+ on the right side and 2+ on the left side.      Heart sounds: Murmur heard.   Systolic murmur is present with a grade of 2/6 at the upper right sternal border.     No friction rub. No gallop.   Pulmonary:      Effort: Pulmonary effort is normal.      Breath sounds: Normal breath sounds. No rales.   Abdominal:      Palpations: Abdomen is soft.      Tenderness: There is no abdominal tenderness.   Musculoskeletal:      Right lower le+ Edema present.      Left lower le+ Edema present.      Comments: IN WC, SCOLIOSIS, EDEMA AT ANKLE,   Skin:     General: Skin is warm and dry.      Capillary Refill: Capillary refill takes less than 2 seconds.   Neurological:       General: No focal deficit present.      Mental Status: She is alert and oriented to person, place, and time.      Cranial Nerves: Cranial nerve deficit ([place]) present.   Psychiatric:         Mood and Affect: Mood normal.         Speech: Speech normal.         Behavior: Behavior normal.               ..    Chemistry        Component Value Date/Time     08/17/2022 0632    K 4.0 08/17/2022 0632     08/17/2022 0632    CO2 23 08/17/2022 0632    BUN 25 (H) 08/17/2022 0632    CREATININE 0.76 08/17/2022 0632    GLU 87 08/17/2022 0632        Component Value Date/Time    CALCIUM 7.6 (L) 08/17/2022 0632    ALKPHOS 123 08/16/2022 0556    AST 55 (H) 08/16/2022 0556    AST 35 04/11/2016 1536    ALT 39 (H) 08/16/2022 0556    BILITOT 0.3 08/16/2022 0556    ESTGFRAFRICA >60 11/09/2020 1754    EGFRNONAA >60 11/09/2020 1754            ..  Lab Results   Component Value Date    CHOL 161 12/08/2019    CHOL 227 (H) 10/08/2016     Lab Results   Component Value Date    HDL 36 (L) 12/08/2019    HDL 64 10/08/2016     Lab Results   Component Value Date    LDLCALC 101.4 12/08/2019    LDLCALC 140.4 10/08/2016     Lab Results   Component Value Date    TRIG 118 12/08/2019    TRIG 113 10/08/2016     Lab Results   Component Value Date    CHOLHDL 22.4 12/08/2019    CHOLHDL 28.2 10/08/2016     ..  Lab Results   Component Value Date    WBC 8.03 08/18/2022    HGB 10.9 (L) 08/18/2022    HCT 32.2 (L) 08/18/2022    MCV 91 08/18/2022     08/18/2022       Test(s) Reviewed  I have reviewed the following in detail:  [] Stress test   [] Angiography   [x] Echocardiogram   [x] Labs   [x] Other:       Assessment:         ICD-10-CM ICD-9-CM   1. Coronary artery disease involving native coronary artery of native heart without angina pectoris  I25.10 414.01   2. Mitral regurgitation and aortic stenosis  I08.0 396.2   3. Long term (current) use of antithrombotics/antiplatelets  Z79.02 V58.63   4. Labile hypertension  R09.89 401.9   5. Annual physical  exam  Z00.00 V70.0   6. Pulmonary hypertension  I27.20 416.8     Problem List Items Addressed This Visit          Pulmonary    Pulmonary hypertension (Chronic)    Relevant Orders    Echo       Cardiac/Vascular    CAD (coronary artery disease) - Primary (Chronic)    Overview     6/2014 LHC- LAD- 85% prox and mid, CX-NSD, RCA-65% prox           Mitral regurgitation and aortic stenosis    Relevant Orders    Echo    Labile hypertension       Other    Annual physical exam    Relevant Orders    IN OFFICE EKG 12-LEAD (to [place]) (Completed)        Plan:     EKG SR, IVCD, DC PLAVIX, INTERVENTION FEW YEARS AGO ASA DAILY, CHECK ECHO, CHANGE NORVASC TO 2.5 DAILY INSTEAD OF 5 MG INTERMITTENTLY HAS BEEN TAKING ABOUT 3 TIMES A WEEK, TAKE NORVASC UNLESS SYSTOLIC BLOOD PRESSURE LESS THAN 130 AS DISCUSSED WITH THE DAUGHTER AND THE PATIENT'S SITTER AMLODIPINE COULD BE CONTRIBUTING TO HER ANKLE EDEMA IN ADDITION TO PROLONGED SEATING, NO ANGINA NO OVERT HEART FAILURE NO TIA TYPE SYMPTOMS NO NEAR-SYNCOPE AVOID EXCESS SODIUM, INCREASE AMBULATION AS MUCH AS POSSIBLE, RETURN TO CLINIC IN 6 MO      Coronary artery disease involving native coronary artery of native heart without angina pectoris    Mitral regurgitation and aortic stenosis  -     Echo    Long term (current) use of antithrombotics/antiplatelets    Labile hypertension    Annual physical exam  -     IN OFFICE EKG 12-LEAD (to [place])    Pulmonary hypertension  -     Echo    Other orders  -     amLODIPine (NORVASC) 2.5 MG tablet; Take 1 tablet (2.5 mg total) by mouth once daily.  Dispense: 30 tablet; Refill: 5    RTC Low level/low impact aerobic exercise 5x's/wk. Heart healthy diet and risk factor modification.    See labs and med orders.    Aerobic exercise 5x's/wk. Heart healthy diet and risk factor modification.    See labs and med orders.

## 2023-04-10 ENCOUNTER — OFFICE VISIT (OUTPATIENT)
Dept: CARDIOLOGY | Facility: CLINIC | Age: 88
End: 2023-04-10
Payer: MEDICARE

## 2023-04-10 VITALS
DIASTOLIC BLOOD PRESSURE: 82 MMHG | HEART RATE: 85 BPM | SYSTOLIC BLOOD PRESSURE: 178 MMHG | WEIGHT: 100.06 LBS | BODY MASS INDEX: 19.65 KG/M2 | HEIGHT: 60 IN

## 2023-04-10 DIAGNOSIS — Z00.00 ANNUAL PHYSICAL EXAM: ICD-10-CM

## 2023-04-10 DIAGNOSIS — R09.89 LABILE HYPERTENSION: ICD-10-CM

## 2023-04-10 DIAGNOSIS — I08.0 MITRAL REGURGITATION AND AORTIC STENOSIS: ICD-10-CM

## 2023-04-10 DIAGNOSIS — I25.10 CORONARY ARTERY DISEASE INVOLVING NATIVE CORONARY ARTERY OF NATIVE HEART WITHOUT ANGINA PECTORIS: Primary | Chronic | ICD-10-CM

## 2023-04-10 DIAGNOSIS — Z79.02 LONG TERM (CURRENT) USE OF ANTITHROMBOTICS/ANTIPLATELETS: Chronic | ICD-10-CM

## 2023-04-10 DIAGNOSIS — I27.20 PULMONARY HYPERTENSION: Chronic | ICD-10-CM

## 2023-04-10 PROCEDURE — 93010 EKG 12-LEAD: ICD-10-PCS | Mod: ,,, | Performed by: INTERNAL MEDICINE

## 2023-04-10 PROCEDURE — 99999 PR PBB SHADOW E&M-EST. PATIENT-LVL IV: ICD-10-PCS | Mod: PBBFAC,,, | Performed by: INTERNAL MEDICINE

## 2023-04-10 PROCEDURE — 93005 ELECTROCARDIOGRAM TRACING: CPT | Mod: PO

## 2023-04-10 PROCEDURE — 99214 OFFICE O/P EST MOD 30 MIN: CPT | Mod: PBBFAC,PO | Performed by: INTERNAL MEDICINE

## 2023-04-10 PROCEDURE — 99999 PR PBB SHADOW E&M-EST. PATIENT-LVL IV: CPT | Mod: PBBFAC,,, | Performed by: INTERNAL MEDICINE

## 2023-04-10 PROCEDURE — 93010 ELECTROCARDIOGRAM REPORT: CPT | Mod: ,,, | Performed by: INTERNAL MEDICINE

## 2023-04-10 PROCEDURE — 99204 PR OFFICE/OUTPT VISIT, NEW, LEVL IV, 45-59 MIN: ICD-10-PCS | Mod: S$PBB,25,, | Performed by: INTERNAL MEDICINE

## 2023-04-10 PROCEDURE — 99204 OFFICE O/P NEW MOD 45 MIN: CPT | Mod: S$PBB,25,, | Performed by: INTERNAL MEDICINE

## 2023-04-10 RX ORDER — AMLODIPINE BESYLATE 2.5 MG/1
2.5 TABLET ORAL DAILY
Qty: 30 TABLET | Refills: 5 | Status: SHIPPED | OUTPATIENT
Start: 2023-04-10 | End: 2023-08-15

## 2023-04-10 RX ORDER — BRIMONIDINE TARTRATE AND TIMOLOL MALEATE 2; 5 MG/ML; MG/ML
1 SOLUTION OPHTHALMIC
COMMUNITY

## 2023-04-10 RX ORDER — SENNOSIDES 8.6 MG/1
1 TABLET ORAL DAILY
COMMUNITY

## 2023-04-26 ENCOUNTER — PATIENT MESSAGE (OUTPATIENT)
Dept: CARDIOLOGY | Facility: CLINIC | Age: 88
End: 2023-04-26
Payer: MEDICARE

## 2023-07-10 PROBLEM — Z00.00 ANNUAL PHYSICAL EXAM: Status: RESOLVED | Noted: 2023-04-10 | Resolved: 2023-07-10

## 2023-08-14 DIAGNOSIS — I25.10 CORONARY ARTERY DISEASE, UNSPECIFIED VESSEL OR LESION TYPE, UNSPECIFIED WHETHER ANGINA PRESENT, UNSPECIFIED WHETHER NATIVE OR TRANSPLANTED HEART: Primary | ICD-10-CM

## 2023-08-15 RX ORDER — AMLODIPINE BESYLATE 2.5 MG/1
TABLET ORAL
Qty: 30 TABLET | Refills: 2 | Status: SHIPPED | OUTPATIENT
Start: 2023-08-15 | End: 2023-10-11

## 2023-10-03 ENCOUNTER — PATIENT MESSAGE (OUTPATIENT)
Dept: CARDIOLOGY | Facility: CLINIC | Age: 88
End: 2023-10-03
Payer: MEDICARE

## 2023-10-09 ENCOUNTER — PATIENT MESSAGE (OUTPATIENT)
Dept: ORTHOPEDICS | Facility: CLINIC | Age: 88
End: 2023-10-09
Payer: MEDICARE

## 2023-10-09 RX ORDER — METHOCARBAMOL 750 MG/1
750 TABLET, FILM COATED ORAL 4 TIMES DAILY PRN
Qty: 60 TABLET | Refills: 2 | Status: SHIPPED | OUTPATIENT
Start: 2023-10-09 | End: 2023-11-08

## 2023-10-11 DIAGNOSIS — I25.10 CORONARY ARTERY DISEASE, UNSPECIFIED VESSEL OR LESION TYPE, UNSPECIFIED WHETHER ANGINA PRESENT, UNSPECIFIED WHETHER NATIVE OR TRANSPLANTED HEART: ICD-10-CM

## 2023-10-11 RX ORDER — AMLODIPINE BESYLATE 2.5 MG/1
TABLET ORAL
Qty: 30 TABLET | Refills: 2 | Status: SHIPPED | OUTPATIENT
Start: 2023-10-11 | End: 2024-01-09

## 2024-01-09 DIAGNOSIS — I25.10 CORONARY ARTERY DISEASE, UNSPECIFIED VESSEL OR LESION TYPE, UNSPECIFIED WHETHER ANGINA PRESENT, UNSPECIFIED WHETHER NATIVE OR TRANSPLANTED HEART: ICD-10-CM

## 2024-01-09 RX ORDER — AMLODIPINE BESYLATE 2.5 MG/1
TABLET ORAL
Qty: 90 TABLET | Refills: 3 | Status: SHIPPED | OUTPATIENT
Start: 2024-01-09